# Patient Record
Sex: FEMALE | Race: WHITE | NOT HISPANIC OR LATINO | Employment: FULL TIME | URBAN - METROPOLITAN AREA
[De-identification: names, ages, dates, MRNs, and addresses within clinical notes are randomized per-mention and may not be internally consistent; named-entity substitution may affect disease eponyms.]

---

## 2017-11-15 ENCOUNTER — TRANSCRIBE ORDERS (OUTPATIENT)
Dept: ADMINISTRATIVE | Age: 22
End: 2017-11-15

## 2017-11-15 ENCOUNTER — APPOINTMENT (OUTPATIENT)
Dept: LAB | Age: 22
End: 2017-11-15
Attending: PREVENTIVE MEDICINE

## 2017-11-15 DIAGNOSIS — Z11.1 SCREENING EXAMINATION FOR PULMONARY TUBERCULOSIS: Primary | ICD-10-CM

## 2017-11-15 DIAGNOSIS — Z11.1 SCREENING EXAMINATION FOR PULMONARY TUBERCULOSIS: ICD-10-CM

## 2017-11-15 PROCEDURE — 86480 TB TEST CELL IMMUN MEASURE: CPT

## 2017-11-15 PROCEDURE — 36415 COLL VENOUS BLD VENIPUNCTURE: CPT

## 2017-11-17 LAB
ANNOTATION COMMENT IMP: NORMAL
GAMMA INTERFERON BACKGROUND BLD IA-ACNC: 0.08 IU/ML
M TB IFN-G BLD-IMP: NEGATIVE
M TB IFN-G CD4+ BCKGRND COR BLD-ACNC: 0.06 IU/ML
M TB IFN-G CD4+ T-CELLS BLD-ACNC: 0.14 IU/ML
MITOGEN IGNF BLD-ACNC: 9.42 IU/ML
QUANTIFERON-TB GOLD IN TUBE: NORMAL
SERVICE CMNT-IMP: NORMAL

## 2018-01-24 ENCOUNTER — HOSPITAL ENCOUNTER (EMERGENCY)
Facility: HOSPITAL | Age: 23
Discharge: HOME | End: 2018-01-24
Attending: EMERGENCY MEDICINE
Payer: OTHER MISCELLANEOUS

## 2018-01-24 VITALS
SYSTOLIC BLOOD PRESSURE: 165 MMHG | HEART RATE: 109 BPM | WEIGHT: 160 LBS | OXYGEN SATURATION: 100 % | TEMPERATURE: 97.9 F | RESPIRATION RATE: 18 BRPM | DIASTOLIC BLOOD PRESSURE: 89 MMHG

## 2018-01-24 DIAGNOSIS — Z57.8 EMPLOYEE EXPOSURE TO BODY FLUIDS: Primary | ICD-10-CM

## 2018-01-24 PROCEDURE — 99282 EMERGENCY DEPT VISIT SF MDM: CPT

## 2018-01-24 SDOH — HEALTH STABILITY - PHYSICAL HEALTH: OCCUPATIONAL EXPOSURE TO OTHER RISK FACTORS: Z57.8

## 2018-01-25 NOTE — ED ATTENDING ATTESTATION
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

## 2018-01-25 NOTE — ED PROVIDER NOTES
History  Chief Complaint   Patient presents with    Body Fluid Exposure     Employee had urine splashed on chin while at work  The pt who's urine it was is positive for ESBL     This is a 23 y o  old female who presents to the ED for evaluation of the the body fluid exposure  Patient was cleaning up p r n  with gloves in a gown on when some urine splashed into her chin  It was not an open mouth  She had no open wounds  There is no bladder physical contamination to the urine  She did not have a face mask on at the time  The patient is on contact isolation due to ESBL E coli  She washed immediately with soap and water  She was referred to the ED for evaluation  Otherwise, patient denies fevers, chills, night sweats, cough, congestion, rhinorrhea, CP, dyspnea, abdominal pain, nausea, vomiting, diarrhea, constipation, urinary symptoms, leg pain or swelling  None     History reviewed  No pertinent past medical history  History reviewed  No pertinent surgical history  History reviewed  No pertinent family history  I have reviewed and agree with the history as documented  Social History   Substance Use Topics    Smoking status: Never Smoker    Smokeless tobacco: Never Used    Alcohol use No      Review of Systems   Constitutional: Negative for chills, fatigue, fever and unexpected weight change  HENT: Negative for congestion, rhinorrhea and sore throat  Eyes: Negative for redness and visual disturbance  Respiratory: Negative for cough and shortness of breath  Cardiovascular: Negative for chest pain and leg swelling  Gastrointestinal: Negative for abdominal pain, constipation, diarrhea, nausea and vomiting  Endocrine: Negative for cold intolerance and heat intolerance  Genitourinary: Negative for dysuria, frequency and urgency  Musculoskeletal: Negative for back pain  Skin: Negative for rash  Neurological: Negative for dizziness, syncope and numbness     All other systems reviewed and are negative  Physical Exam  ED Triage Vitals [01/24/18 1910]   Temperature Pulse Respirations Blood Pressure SpO2   97 9 °F (36 6 °C) (!) 109 18 165/89 100 %      Temp Source Heart Rate Source Patient Position - Orthostatic VS BP Location FiO2 (%)   Oral Monitor Sitting Right arm --      Pain Score       No Pain         Physical Exam   Constitutional: She is oriented to person, place, and time  She appears well-developed and well-nourished  No distress  HENT:   Head: Normocephalic and atraumatic  Nose: Nose normal    Mouth/Throat: Oropharynx is clear and moist  No oropharyngeal exudate  Skin on chin intact   Eyes: EOM are normal  Pupils are equal, round, and reactive to light  Neck: Normal range of motion  Pulmonary/Chest: Effort normal  No stridor  No respiratory distress  Musculoskeletal: Normal range of motion  Neurological: She is alert and oriented to person, place, and time  Moving all extremities equally   Skin: Skin is warm and dry  No rash noted  She is not diaphoretic  Psychiatric: She has a normal mood and affect  Nursing note and vitals reviewed  ED Medications  Medications - No data to display    Diagnostic Studies  Results Reviewed     None        No orders to display     Procedures  Procedures    Phone Consults  ED Phone Contact    ED Course    A/P: This is a 23 y o  female who presents to the ED for evaluation of body fluid exposure  No visible blood  This is a nonsignificant exposure  Exposure paperwork completed  Patient return to full duty  Occumed followup PRN  I personally discussed return precautions with this patient  I provided the patient with written discharge instructions and particularly highlighted specific areas of interest to this patient, including but not limited to: medications for symptom managment, follow up recommendations, and return precautions  Patient is in agreement with this plan as outlined above      GISELA Davis Time    Disposition  Final diagnoses:   Employee exposure to body fluids     Time reflects when diagnosis was documented in both MDM as applicable and the Disposition within this note     Time User Action Codes Description Comment    1/24/2018  8:38 PM Ari Montanajean Add [Z57 8] Employee exposure to body fluids       ED Disposition     ED Disposition Condition Comment    Discharge  Adam Chaney discharge to home/self care  Condition at discharge: Stable        Follow-up Information     Follow up With Specialties Details Why Contact Info    Chelsea Broussard PA-C Occupational Medicine Schedule an appointment as soon as possible for a visit As needed [address]  [phone]          There are no discharge medications for this patient  No discharge procedures on file  ED Provider  Attending physically available and evaluated Adam Chaney  I managed the patient along with the ED Attending      Electronically Signed by         Amy Arboleda MD  01/25/18 0773

## 2018-01-25 NOTE — DISCHARGE INSTRUCTIONS
Body Substance Exposure   WHAT YOU NEED TO KNOW:   Body substance exposure is when you come in contact with another person's blood or body fluid that contains blood  Semen or vaginal fluid can also spread infection  Contact may place you at risk for hepatitis B virus (HBV), human immunodeficiency virus (HIV), or hepatitis C virus (HCV)  DISCHARGE INSTRUCTIONS:   Ways that body substance exposure can occur:   · A needle stick or a cut from a sharp object    · Contact with an open wound, such as a cut, chapped skin, or an abrasion     · Contact with the eyes or mucus membrane, such as the lining of the mouth or nose    · Human bite  What to do when exposed to a body substance:   · Clean the area immediately  Wash an open wound with soap and clean water  Flush your eyes with saline solution or water  Rinse mucus membranes with water or saline solution  · Contact your healthcare provider as soon as possible  He will ask how the exposure happened and where the blood or body fluid touched your body  If possible, tell your healthcare provider about the person's health status and history, such as vaccinations  Treatment works best if started as soon as possible  Treatment that may be given for body substance exposure:  Postexposure prophylaxis (PEP) is medical treatment that may protect a person from infection after exposure to another person's body fluids  PEP may be needed if the person whose fluids you were exposed to has a known infection  Do not donate blood, organs, tissues, or semen until your follow-up is completed at 6 months  · PEP for HBV  may include HBV vaccinations or medicine to prevent HVB  This treatment works best if started within 24 hours of exposure  · PEP for HIV  may include 2 or 3 types of medicine to prevent HIV  This treatment works best if started within 72 hours of exposure  Continue treatment for 4 weeks   Practice safe sex to prevent spreading HIV and to prevent pregnancy during the follow-up period  If you are breastfeeding, your healthcare provider may recommend that you stop  Ask your healthcare provider if you can breastfeed  · PEP for HCV  is not  available  You will need to be tested for HCV and treated if you were infected  Follow up with your healthcare provider as directed: You will need more blood tests  PEP for HIV often causes side effects  Talk with your healthcare provider about your symptoms  He will need to make sure you are taking the medicine correctly  Write down your questions so you remember to ask them during your visits  Prevent body substance exposure: If you care for another person who has HBV, HIV, or HCV, protect yourself and others from infection:  · Wash your hands thoroughly  before and after you provide medical care  · Use protective equipment  Gloves or a face mask may protect your hands, nose, and mouth from splashes of blood or body fluid  · Do not recap needles after use  Recapping needles increases your risk of a needle stick  · Throw away needles in a safe container  A hard container with a lid may prevent accidental needle sticks  Contact your healthcare provider if:   · You have a fever  · You have a rash  · You have weakness or muscle pain  · You have abdominal pain, nausea, or vomiting  · You have diarrhea  · You have a headache  · You have questions or concerns about your condition or care  © 2017 [employer]  Information is for End User's use only and may not be sold, redistributed or otherwise used for commercial purposes  All illustrations and images included in CareNotes® are the copyrighted property of [employer]  or [employer]  The above information is an  only  It is not intended as medical advice for individual conditions or treatments   Talk to your doctor, nurse or pharmacist before following any medical regimen to see if it is safe and effective for you

## 2018-11-25 ENCOUNTER — HOSPITAL ENCOUNTER (EMERGENCY)
Facility: HOSPITAL | Age: 23
Discharge: HOME | End: 2018-11-26
Attending: EMERGENCY MEDICINE | Admitting: EMERGENCY MEDICINE
Payer: COMMERCIAL

## 2018-11-25 VITALS
SYSTOLIC BLOOD PRESSURE: 161 MMHG | TEMPERATURE: 98.4 F | OXYGEN SATURATION: 99 % | HEART RATE: 101 BPM | RESPIRATION RATE: 18 BRPM | DIASTOLIC BLOOD PRESSURE: 85 MMHG

## 2018-11-25 DIAGNOSIS — R25.2 MUSCLE CRAMPS: Primary | ICD-10-CM

## 2018-11-25 PROCEDURE — 99283 EMERGENCY DEPT VISIT LOW MDM: CPT

## 2018-11-26 LAB
ANION GAP SERPL CALCULATED.3IONS-SCNC: 10 MMOL/L (ref 4–13)
BUN SERPL-MCNC: 17 MG/DL (ref 5–25)
CALCIUM SERPL-MCNC: 9.4 MG/DL (ref 8.3–10.1)
CHLORIDE SERPL-SCNC: 101 MMOL/L (ref 100–108)
CO2 SERPL-SCNC: 26 MMOL/L (ref 21–32)
CREAT SERPL-MCNC: 0.81 MG/DL (ref 0.6–1.3)
GFR SERPL CREATININE-BSD FRML MDRD: 104 ML/MIN/1.73SQ M
GLUCOSE SERPL-MCNC: 105 MG/DL (ref 65–140)
MAGNESIUM SERPL-MCNC: 2 MG/DL (ref 1.6–2.6)
PHOSPHATE SERPL-MCNC: 4 MG/DL (ref 2.7–4.5)
POTASSIUM SERPL-SCNC: 3.9 MMOL/L (ref 3.5–5.3)
SODIUM SERPL-SCNC: 137 MMOL/L (ref 136–145)

## 2018-11-26 PROCEDURE — 83735 ASSAY OF MAGNESIUM: CPT | Performed by: EMERGENCY MEDICINE

## 2018-11-26 PROCEDURE — 80048 BASIC METABOLIC PNL TOTAL CA: CPT | Performed by: EMERGENCY MEDICINE

## 2018-11-26 PROCEDURE — 84100 ASSAY OF PHOSPHORUS: CPT | Performed by: EMERGENCY MEDICINE

## 2018-11-26 PROCEDURE — 96361 HYDRATE IV INFUSION ADD-ON: CPT

## 2018-11-26 PROCEDURE — 96374 THER/PROPH/DIAG INJ IV PUSH: CPT

## 2018-11-26 PROCEDURE — 36415 COLL VENOUS BLD VENIPUNCTURE: CPT | Performed by: EMERGENCY MEDICINE

## 2018-11-26 RX ORDER — KETOROLAC TROMETHAMINE 30 MG/ML
15 INJECTION, SOLUTION INTRAMUSCULAR; INTRAVENOUS ONCE
Status: COMPLETED | OUTPATIENT
Start: 2018-11-26 | End: 2018-11-26

## 2018-11-26 RX ORDER — CYCLOBENZAPRINE HCL 10 MG
10 TABLET ORAL 2 TIMES DAILY PRN
Qty: 20 TABLET | Refills: 0 | Status: SHIPPED | OUTPATIENT
Start: 2018-11-26 | End: 2020-01-09

## 2018-11-26 RX ADMIN — KETOROLAC TROMETHAMINE 15 MG: 30 INJECTION, SOLUTION INTRAMUSCULAR at 00:52

## 2018-11-26 RX ADMIN — SODIUM CHLORIDE 1000 ML: 0.9 INJECTION, SOLUTION INTRAVENOUS at 00:50

## 2018-11-26 NOTE — ED PROVIDER NOTES
History  Chief Complaint   Patient presents with    Leg Pain     pt c/o "getting a charlier horse earlier today in her right calf " Pt states the pain comes and goes, no redness or warmth      28-year-old female presents chief complaint of right calf pain  Patient reports she has been getting charley horses (muscle cramps of her calf muscle) for the past month  Patient reports this morning she woke up with a severe cramp in her calf and it took quite some time for her to stretch the muscle out  Over the course of the day she has had pain with ambulation and point tenderness in her calf  No swelling or erythema  No chest pain or shortness of breath  No clinical signs of DVT  History provided by:  Patient   used: No    Leg Pain   Location:  Leg  Leg location:  R lower leg  Pain details:     Quality:  Aching and cramping    Radiates to:  Does not radiate    Severity:  Mild    Onset quality:  Gradual    Duration:  1 month    Timing:  Intermittent    Progression:  Waxing and waning  Chronicity:  New  Dislocation: no    Prior injury to area:  No  Relieved by: stretching  Worsened by: Activity  Ineffective treatments:  None tried  Associated symptoms: stiffness    Associated symptoms: no fever, no numbness and no swelling        None       History reviewed  No pertinent past medical history  History reviewed  No pertinent surgical history  History reviewed  No pertinent family history  I have reviewed and agree with the history as documented  Social History   Substance Use Topics    Smoking status: Never Smoker    Smokeless tobacco: Never Used    Alcohol use No        Review of Systems   Constitutional: Negative for fever  Respiratory: Negative for chest tightness and shortness of breath  Cardiovascular: Negative for leg swelling  Musculoskeletal: Positive for myalgias (right lower leg pain) and stiffness  Skin: Negative for color change and wound     Neurological: Negative for weakness and numbness  Physical Exam  Physical Exam   Constitutional: She is oriented to person, place, and time  She appears well-developed and well-nourished  No distress  HENT:   Head: Normocephalic and atraumatic  Eyes: Pupils are equal, round, and reactive to light  EOM are normal    Neck: Normal range of motion  No JVD present  Cardiovascular: Normal rate, regular rhythm, normal heart sounds and intact distal pulses  Exam reveals no gallop and no friction rub  No murmur heard  Pulmonary/Chest: Effort normal and breath sounds normal  No respiratory distress  She has no wheezes  She has no rales  She exhibits no tenderness  Musculoskeletal: Normal range of motion  She exhibits no tenderness  Neurological: She is alert and oriented to person, place, and time  Skin: Skin is warm and dry  Psychiatric: She has a normal mood and affect  Her behavior is normal  Judgment and thought content normal    Nursing note and vitals reviewed        Vital Signs  ED Triage Vitals [11/25/18 2257]   Temperature Pulse Respirations Blood Pressure SpO2   98 4 °F (36 9 °C) 101 18 161/85 99 %      Temp Source Heart Rate Source Patient Position - Orthostatic VS BP Location FiO2 (%)   Oral Monitor Sitting Left arm --      Pain Score       6           Vitals:    11/25/18 2257   BP: 161/85   Pulse: 101   Patient Position - Orthostatic VS: Sitting       Visual Acuity      ED Medications  Medications   sodium chloride 0 9 % bolus 1,000 mL (0 mL Intravenous Stopped 11/26/18 0127)   ketorolac (TORADOL) injection 15 mg (15 mg Intravenous Given 11/26/18 0052)       Diagnostic Studies  Results Reviewed     Procedure Component Value Units Date/Time    Basic metabolic panel [[id number]] Collected:  11/26/18 0049    Lab Status:  Final result Specimen:  Blood Updated:  11/26/18 0105     Sodium 137 mmol/L      Potassium 3 9 mmol/L      Chloride 101 mmol/L      CO2 26 mmol/L      ANION GAP 10 mmol/L      BUN 17 mg/dL Creatinine 0 81 mg/dL      Glucose 105 mg/dL      Calcium 9 4 mg/dL      eGFR 104 ml/min/1 73sq m     Narrative:         National Kidney Disease Education Program recommendations are as follows:  GFR calculation is accurate only with a steady state creatinine  Chronic Kidney disease less than 60 ml/min/1 73 sq  meters  Kidney failure less than 15 ml/min/1 73 sq  meters  Phosphorus [[id number]]  (Normal) Collected:  11/26/18 0049    Lab Status:  Final result Specimen:  Blood Updated:  11/26/18 0105     Phosphorus 4 0 mg/dL     Magnesium [[id number]]  (Normal) Collected:  11/26/18 0049    Lab Status:  Final result Specimen:  Blood Updated:  11/26/18 0105     Magnesium 2 0 mg/dL                  No orders to display              Procedures  Procedures       Phone Contacts  ED Phone Contact    ED Course                               MDM  Number of Diagnoses or Management Options  Muscle cramps: new and requires workup  Diagnosis management comments: Right calf pain and cramping  Possible metabolic derangement  No signs of DVT or Baker cyst this point  Will check metabolites  Will give patient return instructions for DVT signs and symptoms  Amount and/or Complexity of Data Reviewed  Clinical lab tests: ordered and reviewed    Risk of Complications, Morbidity, and/or Mortality  Diagnostic procedures: high    Patient Progress  Patient progress: stable    CritCare Time    Disposition  Final diagnoses:   Muscle cramps     Time reflects when diagnosis was documented in both MDM as applicable and the Disposition within this note     Time User Action Codes Description Comment    11/26/2018  1:14 AM Olena Hatfield Add [R25 2] Muscle cramps       ED Disposition     ED Disposition Condition Comment    Discharge  May Dontrell discharge to home/self care      Condition at discharge: Good        Follow-up Information     Follow up With Specialties Details Why Contact Info    Jimi Darnell, [id number] Practitioner Schedule an appointment as soon as possible for a visit As needed [address] [place] [address]  [phone]            Discharge Medication List as of 11/26/2018  1:14 AM      START taking these medications    Details   cyclobenzaprine (FLEXERIL) 10 mg tablet Take 1 tablet (10 mg total) by mouth 2 (two) times a day as needed for muscle spasms for up to 10 days, Starting Mon 11/26/2018, Until Thu 12/6/2018, Print           No discharge procedures on file      ED Provider  Electronically Signed by           Qasim Ulloa MD  11/26/18 7169

## 2018-11-26 NOTE — DISCHARGE INSTRUCTIONS
Muscle Cramp   WHAT YOU NEED TO KNOW:   A muscle cramp is a sudden, sharp pain or spasm in a muscle  It lasts from a few seconds to a few minutes  Muscle cramps most often occur in your legs or feet  They are also common along your ribcage and in your arms and hands  DISCHARGE INSTRUCTIONS:   Manage a muscle cramp:  Muscle cramps often go away without any treatment  You can do the following to help relieve a cramp:  · Stop the activity  that caused the muscle cramp  · Stretch or massage  your muscle until the cramp goes away  · Apply ice  to sore muscles  Use an ice pack, or put crushed ice in a plastic bag  Cover it with a towel, and place it on your sore muscles for 15 to 20 minutes every hour or as directed  Ice decreases swelling and pain  · Apply heat  to tense, tight muscles for 20 to 30 minutes every 2 hours for as many days as directed  Heat helps decrease pain and muscle spasms  Prevent a muscle cramp:   · Stretch your muscles  Stretch 3 times daily, including before bedtime and before exercise  Stretch briefly, and then release each stretch  Do not stretch so far that you feel pain  Daily stretches will relax your muscles and increase flexibility  Ask your healthcare provider for instructions on muscle stretches that are right for you  · Warm up before you exercise  Run in place slowly or walk at a brisk pace to warm your muscles  · Drink liquids as directed  Liquids can help prevent muscle cramps caused by dehydration  Ask your healthcare provider how much liquid to drink each day and which liquids are best for you  You may need to drink liquids that replace lost electrolytes, such as sports drinks  · Eat a variety of healthy foods  Healthy foods may help prevent muscle cramps  Healthy foods include bananas, beans, avocados, or other foods high in electrolytes  Ask if you should eat more carbohydrates    Follow up with your healthcare provider as directed:  Write down your questions so you remember to ask them during your visits  Contact your healthcare provider if:   · Your cramp does not go away, even after you stretch and apply ice or heat  · You have muscle cramps often  · You have questions or concerns about your condition or care  Return to the emergency department if:   · You cannot urinate, or your urine is dark yellow or brown within 24 hours of the cramp  · You have pain in your neck or back  · Your arm or leg is weak or numb, or the area around your cramp is numb  · You have trouble moving your cramped muscle  © 2017 [employer]  Information is for End User's use only and may not be sold, redistributed or otherwise used for commercial purposes  All illustrations and images included in CareNotes® are the copyrighted property of [employer]  or [employer]  The above information is an  only  It is not intended as medical advice for individual conditions or treatments  Talk to your doctor, nurse or pharmacist before following any medical regimen to see if it is safe and effective for you

## 2019-12-31 ENCOUNTER — OFFICE VISIT (OUTPATIENT)
Dept: URGENT CARE | Facility: CLINIC | Age: 24
End: 2019-12-31
Payer: COMMERCIAL

## 2019-12-31 VITALS
HEART RATE: 100 BPM | SYSTOLIC BLOOD PRESSURE: 128 MMHG | DIASTOLIC BLOOD PRESSURE: 71 MMHG | WEIGHT: 187 LBS | HEIGHT: 63 IN | TEMPERATURE: 98 F | BODY MASS INDEX: 33.13 KG/M2 | RESPIRATION RATE: 20 BRPM | OXYGEN SATURATION: 97 %

## 2019-12-31 DIAGNOSIS — J11.1 INFLUENZA: Primary | ICD-10-CM

## 2019-12-31 PROCEDURE — 99213 OFFICE O/P EST LOW 20 MIN: CPT | Performed by: PHYSICIAN ASSISTANT

## 2019-12-31 RX ORDER — OSELTAMIVIR PHOSPHATE 75 MG/1
75 CAPSULE ORAL EVERY 12 HOURS SCHEDULED
Qty: 10 CAPSULE | Refills: 0 | Status: SHIPPED | OUTPATIENT
Start: 2019-12-31 | End: 2020-01-05

## 2019-12-31 RX ORDER — BROMPHENIRAMINE MALEATE, PSEUDOEPHEDRINE HYDROCHLORIDE, AND DEXTROMETHORPHAN HYDROBROMIDE 2; 30; 10 MG/5ML; MG/5ML; MG/5ML
10 SYRUP ORAL 4 TIMES DAILY PRN
Qty: 118 ML | Refills: 0 | Status: SHIPPED | OUTPATIENT
Start: 2019-12-31 | End: 2021-01-05

## 2019-12-31 NOTE — PROGRESS NOTES
[address] [facility] [address] [employer] [facility]        NAME: Clemente Lujan is a 27 y o  female  : 1995    MRN: [id number]  DATE: 2019  TIME: 9:11 AM    Assessment and Plan   Influenza [J11 1]  1  Influenza  oseltamivir (TAMIFLU) 75 mg capsule    brompheniramine-pseudoephedrine-DM 30-2-10 MG/5ML syrup         Patient Instructions   Prescriptions sent to the pharmacy for Tamiflu and Bromfed-use as directed  Saline nasal spray several times daily, cool mist humidifier, Tylenol/ibuprofen as needed for fever or pain  Follow up with PCP in 3-5 days  Proceed to  ER if symptoms worsen  Chief Complaint     Chief Complaint   Patient presents with    Flu Symptoms     started yesterday with fever chills cough, nasal congestion  Temp 101this am taking tylenol  History of Present Illness    The patient is a 51-year-old female who presented with flu symptoms that started yesterday  Positive fever 101-102 F  Shaking chills  Severe myalgias  Positive headache  Positive nasal and sinus congestion  Nonproductive cough without shortness of breath or wheezing  Negative vomiting or diarrhea  HPI    Review of Systems   Review of Systems   Constitutional: Positive for activity change, appetite change, chills, fatigue and fever  HENT: Positive for congestion, postnasal drip and rhinorrhea  Negative for ear discharge, ear pain, facial swelling, mouth sores, sinus pressure, sinus pain, sneezing, sore throat and trouble swallowing  Eyes: Negative for pain, discharge, redness and itching  Respiratory: Positive for cough  Negative for apnea, chest tightness, shortness of breath, wheezing and stridor  Cardiovascular: Negative for chest pain  Gastrointestinal: Negative for abdominal distention, abdominal pain, diarrhea, nausea and vomiting  Genitourinary: Negative for difficulty urinating  Musculoskeletal: Positive for myalgias  Negative for arthralgias  Skin: Negative for pallor and rash  Allergic/Immunologic: Negative  Neurological: Positive for headaches  Negative for dizziness and light-headedness  Hematological: Negative  Psychiatric/Behavioral: Negative  All other systems reviewed and are negative  Current Medications       Current Outpatient Medications:     desogestrel-ethinyl estradiol (CAZIANT) 0 1/0 125/0 15 -0 025 MG tablet, TAKE 1 TABLET BY MOUTH EVERY DAY, Disp: , Rfl:     brompheniramine-pseudoephedrine-DM 30-2-10 MG/5ML syrup, Take 10 mL by mouth 4 (four) times a day as needed for congestion, Disp: 118 mL, Rfl: 0    cyclobenzaprine (FLEXERIL) 10 mg tablet, Take 1 tablet (10 mg total) by mouth 2 (two) times a day as needed for muscle spasms for up to 10 days, Disp: 20 tablet, Rfl: 0    oseltamivir (TAMIFLU) 75 mg capsule, Take 1 capsule (75 mg total) by mouth every 12 (twelve) hours for 5 days, Disp: 10 capsule, Rfl: 0    Current Allergies     Allergies as of 12/31/2019 - Reviewed 12/31/2019   Allergen Reaction Noted    Penicillins Rash 01/24/2018            The following portions of the patient's history were reviewed and updated as appropriate: allergies, current medications, past family history, past medical history, past social history, past surgical history and problem list      No past medical history on file  No past surgical history on file  No family history on file  Medications have been verified  Objective   /71 (Patient Position: Sitting)   Pulse 100   Temp 98 °F (36 7 °C) (Temporal)   Resp 20   Ht 5' 3" (1 6 m)   Wt 84 8 kg (187 lb)   SpO2 97%   BMI 33 13 kg/m²        Physical Exam     Physical Exam   Constitutional: She is oriented to person, place, and time  She appears well-developed and well-nourished  Non-toxic appearance  She does not have a sickly appearance  She appears ill  No distress  HENT:   Head: Normocephalic and atraumatic     Right Ear: Hearing, tympanic membrane, external ear and ear canal normal  No drainage or tenderness  Tympanic membrane is not perforated, not erythematous, not retracted and not bulging  No middle ear effusion  No decreased hearing is noted  Left Ear: Hearing, tympanic membrane, external ear and ear canal normal  No drainage or tenderness  Tympanic membrane is not perforated, not erythematous, not retracted and not bulging  No middle ear effusion  No decreased hearing is noted  Nose: Nose normal  No mucosal edema, rhinorrhea or septal deviation  Right sinus exhibits no maxillary sinus tenderness and no frontal sinus tenderness  Left sinus exhibits no maxillary sinus tenderness and no frontal sinus tenderness  Mouth/Throat: Uvula is midline, oropharynx is clear and moist and mucous membranes are normal  No oral lesions  No dental abscesses or uvula swelling  No oropharyngeal exudate, posterior oropharyngeal edema, posterior oropharyngeal erythema or tonsillar abscesses  Eyes: Pupils are equal, round, and reactive to light  Conjunctivae and EOM are normal    Neck: Trachea normal, normal range of motion and full passive range of motion without pain  Neck supple  No JVD present  No edema and no erythema present  No thyromegaly present  Cardiovascular: Normal rate, regular rhythm, S1 normal, S2 normal, normal heart sounds, intact distal pulses and normal pulses  No murmur heard  Pulmonary/Chest: Effort normal and breath sounds normal  No accessory muscle usage or stridor  No tachypnea  No respiratory distress  She has no decreased breath sounds  She has no wheezes  She has no rhonchi  She has no rales  Abdominal: Soft  Normal appearance and bowel sounds are normal  She exhibits no distension  There is no hepatosplenomegaly  There is no tenderness  Musculoskeletal: Normal range of motion  She exhibits no edema  Neurological: She is alert and oriented to person, place, and time  Skin: Skin is warm, dry and intact  No abrasion, no lesion and no rash noted   She is not diaphoretic  No cyanosis or erythema  Nails show no clubbing  Psychiatric: She has a normal mood and affect  Her speech is normal and behavior is normal    Nursing note and vitals reviewed

## 2019-12-31 NOTE — LETTER
December 31, 2019     Patient: Ioana Fink   YOB: 1995   Date of Visit: 12/31/2019       To Whom it May Concern:    Ioana Fink is under my professional care  She was seen in my office on 12/31/2019  She may return to work when she is fever and symptom free  If you have any questions or concerns, please don't hesitate to call           Sincerely,          Arsalan Plaza PA-C        CC: Ioana Fink

## 2019-12-31 NOTE — PATIENT INSTRUCTIONS
Prescriptions sent to the pharmacy for Tamiflu and Bromfed-use as directed  Saline nasal spray several times daily, cool mist humidifier, Tylenol/ibuprofen as needed for fever or pain  Follow up with PCP in 3-5 days  Proceed to  ER if symptoms worsen  Influenza   AMBULATORY CARE:   Influenza  (the flu) is an infection caused by the influenza virus  The flu is easily spread when an infected person coughs, sneezes, or has close contact with others  You may be able to spread the flu to others for 1 week or longer after signs or symptoms appear  Common signs and symptoms include the following:   · Fever and chills    · Headaches, body aches, and muscle or joint pain    · Cough, runny nose, and sore throat    · Loss of appetite, nausea, vomiting, or diarrhea    · Tiredness    · Trouble breathing  Call 911 for any of the following:   · You have trouble breathing, and your lips look purple or blue  · You have a seizure  Seek care immediately if:   · You are dizzy, or you are urinating less or not at all  · You have a headache with a stiff neck, and you feel tired or confused  · You have new pain or pressure in your chest     · Your symptoms, such as shortness of breath, vomiting, or diarrhea, get worse  · Your symptoms, such as fever and coughing, seem to get better, but then get worse  Contact your healthcare provider if:   · You have new muscle pain or weakness  · You have questions or concerns about your condition or care  Treatment for influenza  may include any of the following:  · Acetaminophen  decreases pain and fever  It is available without a doctor's order  Ask how much to take and how often to take it  Follow directions  Acetaminophen can cause liver damage if not taken correctly  · NSAIDs , such as ibuprofen, help decrease swelling, pain, and fever  This medicine is available with or without a doctor's order   NSAIDs can cause stomach bleeding or kidney problems in certain people  If you take blood thinner medicine, always ask your healthcare provider if NSAIDs are safe for you  Always read the medicine label and follow directions  · Antivirals  help fight a viral infection  Manage your symptoms:   · Rest  as much as you can to help you recover  · Drink liquids as directed  to help prevent dehydration  Ask how much liquid to drink each day and which liquids are best for you  Prevent the spread of the flu:   · Wash your hands often  Use soap and water  Wash your hands after you use the bathroom, change a child's diapers, or sneeze  Wash your hands before you prepare or eat food  Use gel hand cleanser when soap and water are not available  Do not touch your eyes, nose, or mouth unless you have washed your hands first            · Cover your mouth when you sneeze or cough  Cough into a tissue or the bend of your arm  · Clean shared items with a germ-killing   Clean table surfaces, doorknobs, and light switches  Do not share towels, silverware, and dishes with people who are sick  Wash bed sheets, towels, silverware, and dishes with soap and water  · Wear a mask  over your mouth and nose if you are sick or are near anyone who is sick  · Stay away from others  if you are sick  · Influenza vaccine  helps prevent influenza (flu)  Everyone older than 6 months should get a yearly influenza vaccine  Get the vaccine as soon as it is available, usually in September or October each year  Follow up with your healthcare provider as directed:  Write down your questions so you remember to ask them during your visits  © 2017 [address] [employer] Information is for End User's use only and may not be sold, redistributed or otherwise used for commercial purposes  All illustrations and images included in CareNotes® are the copyrighted property of [employer]  or [employer] Prado  The above information is an  only   It is not intended as medical advice for individual conditions or treatments  Talk to your doctor, nurse or pharmacist before following any medical regimen to see if it is safe and effective for you

## 2020-01-09 ENCOUNTER — OFFICE VISIT (OUTPATIENT)
Dept: URGENT CARE | Facility: CLINIC | Age: 25
End: 2020-01-09
Payer: COMMERCIAL

## 2020-01-09 VITALS
SYSTOLIC BLOOD PRESSURE: 142 MMHG | WEIGHT: 184 LBS | TEMPERATURE: 97.9 F | BODY MASS INDEX: 32.6 KG/M2 | HEIGHT: 63 IN | HEART RATE: 96 BPM | RESPIRATION RATE: 18 BRPM | DIASTOLIC BLOOD PRESSURE: 96 MMHG | OXYGEN SATURATION: 97 %

## 2020-01-09 DIAGNOSIS — J01.00 ACUTE MAXILLARY SINUSITIS, RECURRENCE NOT SPECIFIED: Primary | ICD-10-CM

## 2020-01-09 PROCEDURE — 99213 OFFICE O/P EST LOW 20 MIN: CPT | Performed by: PHYSICIAN ASSISTANT

## 2020-01-09 RX ORDER — CLINDAMYCIN HYDROCHLORIDE 300 MG/1
300 CAPSULE ORAL 3 TIMES DAILY
Qty: 21 CAPSULE | Refills: 0 | Status: SHIPPED | OUTPATIENT
Start: 2020-01-09 | End: 2020-01-16

## 2020-01-09 RX ORDER — BENZONATATE 200 MG/1
200 CAPSULE ORAL 3 TIMES DAILY PRN
Qty: 20 CAPSULE | Refills: 0 | Status: SHIPPED | OUTPATIENT
Start: 2020-01-09 | End: 2021-01-05

## 2020-01-09 NOTE — LETTER
January 9, 2020     Patient: Abdi Judge   YOB: 1995   Date of Visit: 1/9/2020       To Whom it May Concern:    Abdi Judge is under my professional care  She was seen in my office on 1/9/2020  She may return to work 1/11/2020  If you have any questions or concerns, please don't hesitate to call           Sincerely,          Magdy Coley PA-C        CC: Abdi Sanchezs

## 2020-01-10 NOTE — PATIENT INSTRUCTIONS
Prescriptions sent to the pharmacy for clindamycin and Tessalon Perles-use as directed  Saline nasal spray several times daily, Flonase in a m , cool mist humidifier, Tylenol/ibuprofen as needed for fever or pain  Follow up with PCP in 3-5 days  Proceed to  ER if symptoms worsen  Rhinosinusitis   AMBULATORY CARE:   Rhinosinusitis (RS)  is inflammation of your nose and sinuses  It commonly begins as a virus, often as a common cold  Viruses usually last 7 to 10 days and do not need treatment  When the virus does not get better on its own, you may have bacterial RS  This means that bacteria have begun to grow inside your sinuses  Acute RS lasts less than 4 weeks  Chronic RS lasts 12 weeks or more  Recurrent RS is when you have 4 or more episodes of RS in one year  Your signs and symptoms  may be worse when you lie on your back or try to sleep  You may have any of the following:  · Stuffy nose and reduced sense of smell     · Runny nose with thick yellow or green mucus     · Pressure or pain on your face or a headache     · Pain in your teeth or bad breath     · Ear pain or pressure     · Fever or cough     · Tiredness  Seek care immediately if:   · You have double vision or you cannot see  · You have a stiff neck, a fever, or a bad headache  · Your eyeball bulges out or you cannot move your eye  · Your eye and eyelid are red, swollen, and painful  · You cannot open your eye  · You are more sleepy than normal, or you notice changes in your ability to think, move, or talk  · You have swelling of your forehead or scalp  Contact your healthcare provider if:   · Your symptoms are worse or do not improve after 3 to 5 days of treatment  · You have questions or concerns about your condition or care  Treatment for rhinosinusitis  may include any of the following:  · Acetaminophen  decreases pain and fever  It is available without a doctor's order   Ask how much to take and how often to take it  Follow directions  Acetaminophen can cause liver damage if not taken correctly  · NSAIDs , such as ibuprofen, help decrease swelling, pain, and fever  This medicine is available with or without a doctor's order  NSAIDs can cause stomach bleeding or kidney problems in certain people  If you take blood thinner medicine, always ask your healthcare provider if NSAIDs are safe for you  Always read the medicine label and follow directions  · Nasal steroid sprays  decrease inflammation in your nose and sinuses  · Decongestants  reduce swelling and drain mucus in the nose and sinuses  They may help you breathe easier  · Antihistamines  dry mucus in the nose and relieve sneezing  · Antibiotics  treat a bacterial infection and may be needed if your symptoms do not improve or they get worse  · Take your medicine as directed  Contact your healthcare provider if you think your medicine is not helping or if you have side effects  Tell him or her if you are allergic to any medicine  Keep a list of the medicines, vitamins, and herbs you take  Include the amounts, and when and why you take them  Bring the list or the pill bottles to follow-up visits  Carry your medicine list with you in case of an emergency  Self-care:   · Rinse your sinuses  Use a sinus rinse device to rinse your nasal passages with a saline (salt water) solution  This will help thin the mucus in your nose and rinse away pollen and dirt  It will also help reduce swelling so you can breathe normally  Ask your healthcare provider how often to do this  · Breathe in steam   Heat a bowl of water until you see steam  Lean over the bowl and make a tent over your head with a large towel  Breathe deeply for about 20 minutes  Be careful not to get too close to the steam or burn yourself  Do this 3 times a day  You can also breathe deeply when you take a hot shower  · Sleep with your head elevated    Place an extra pillow under your head before you go to sleep to help your sinuses drain  · Drink liquids as directed  Ask your healthcare provider how much liquid to drink each day and which liquids are best for you  Liquids will thin the mucus in your nose and help it drain  Avoid drinks that contain alcohol or caffeine  · Do not smoke, and avoid secondhand smoke  Nicotine and other chemicals in cigarettes and cigars can make your symptoms worse  Ask your healthcare provider for information if you currently smoke and need help to quit  E-cigarettes or smokeless tobacco still contain nicotine  Talk to your healthcare provider before you use these products  Follow up with your healthcare provider as directed: Follow up if your symptoms are worse or not better after 3 to 5 days of treatment  Write down your questions so you remember to ask them during your visits  © 2017 [facility] Information is for End User's use only and may not be sold, redistributed or otherwise used for commercial purposes  All illustrations and images included in CareNotes® are the copyrighted property of [employer]  or [employer] Prado  The above information is an  only  It is not intended as medical advice for individual conditions or treatments  Talk to your doctor, nurse or pharmacist before following any medical regimen to see if it is safe and effective for you

## 2020-01-10 NOTE — PROGRESS NOTES
[facility]        NAME: Steve Prince is a 27 y o  female  : 1995    MRN: [id number]  DATE: 2020  TIME: 8:24 PM    Assessment and Plan   Acute maxillary sinusitis, recurrence not specified [J01 00]  1  Acute maxillary sinusitis, recurrence not specified  clindamycin (CLEOCIN) 300 MG capsule    benzonatate (TESSALON) 200 MG capsule         Patient Instructions   Prescriptions sent to the pharmacy for clindamycin and Tessalon Perles-use as directed  Saline nasal spray several times daily, Flonase in a m , cool mist humidifier, Tylenol/ibuprofen as needed for fever or pain  Follow up with PCP in 3-5 days  Proceed to  ER if symptoms worsen  Chief Complaint     Chief Complaint   Patient presents with    Cough     x 10 days    Fatigue         History of Present Illness   The patient is a 24-year-old female who presents with cold symptoms that have been present for a week and a half  She was diagnosed with influenza last week and finished a course of Tamiflu  She states that her fever, headache and body aches have improved  She continues to have fatigue  Positive nasal and sinus congestion  Positive facial pain and pressure  Nonproductive cough without shortness of breath or wheezing  Negative abdominal pain, nausea, vomiting or diarrhea  She is currently taking Mucinex for her symptoms  HPI    Review of Systems   Review of Systems   Constitutional: Positive for fatigue  Negative for activity change, chills and fever  HENT: Positive for congestion, postnasal drip, rhinorrhea, sinus pressure and sinus pain  Negative for ear discharge, ear pain, facial swelling, mouth sores, sneezing, sore throat and trouble swallowing  Eyes: Negative for pain, discharge, redness and itching  Respiratory: Positive for cough  Negative for apnea, chest tightness, shortness of breath, wheezing and stridor  Cardiovascular: Negative for chest pain     Gastrointestinal: Negative for abdominal distention, abdominal pain, diarrhea, nausea and vomiting  Genitourinary: Negative for difficulty urinating  Musculoskeletal: Negative for arthralgias and myalgias  Skin: Negative for pallor and rash  Allergic/Immunologic: Negative  Neurological: Negative for dizziness, light-headedness and headaches  Hematological: Negative  Psychiatric/Behavioral: Negative  All other systems reviewed and are negative  Current Medications       Current Outpatient Medications:     desogestrel-ethinyl estradiol (CAZIANT) 0 1/0 125/0 15 -0 025 MG tablet, TAKE 1 TABLET BY MOUTH EVERY DAY, Disp: , Rfl:     benzonatate (TESSALON) 200 MG capsule, Take 1 capsule (200 mg total) by mouth 3 (three) times a day as needed for cough, Disp: 20 capsule, Rfl: 0    brompheniramine-pseudoephedrine-DM 30-2-10 MG/5ML syrup, Take 10 mL by mouth 4 (four) times a day as needed for congestion, Disp: 118 mL, Rfl: 0    clindamycin (CLEOCIN) 300 MG capsule, Take 1 capsule (300 mg total) by mouth 3 (three) times a day for 7 days, Disp: 21 capsule, Rfl: 0    Current Allergies     Allergies as of 01/09/2020 - Reviewed 01/09/2020   Allergen Reaction Noted    Penicillins Rash 01/24/2018            The following portions of the patient's history were reviewed and updated as appropriate: allergies, current medications, past family history, past medical history, past social history, past surgical history and problem list      History reviewed  No pertinent past medical history  History reviewed  No pertinent surgical history  No family history on file  Medications have been verified  Objective   /96   Pulse 96   Temp 97 9 °F (36 6 °C)   Resp 18   Ht 5' 3" (1 6 m)   Wt 83 5 kg (184 lb)   SpO2 97%   BMI 32 59 kg/m²        Physical Exam     Physical Exam   Constitutional: She is oriented to person, place, and time  She appears well-developed and well-nourished  She appears ill  No distress     HENT: Head: Normocephalic and atraumatic  Right Ear: Hearing, tympanic membrane, external ear and ear canal normal  No drainage or tenderness  Tympanic membrane is not perforated, not erythematous, not retracted and not bulging  No middle ear effusion  No decreased hearing is noted  Left Ear: Hearing, tympanic membrane, external ear and ear canal normal  No drainage or tenderness  Tympanic membrane is not perforated, not erythematous, not retracted and not bulging  No middle ear effusion  No decreased hearing is noted  Nose: Mucosal edema and rhinorrhea present  No septal deviation  Right sinus exhibits maxillary sinus tenderness and frontal sinus tenderness  Left sinus exhibits maxillary sinus tenderness and frontal sinus tenderness  Mouth/Throat: Uvula is midline and mucous membranes are normal  No oral lesions  No dental abscesses or uvula swelling  Posterior oropharyngeal erythema present  No oropharyngeal exudate, posterior oropharyngeal edema or tonsillar abscesses  Eyes: Pupils are equal, round, and reactive to light  Conjunctivae and EOM are normal    Neck: Trachea normal, normal range of motion and full passive range of motion without pain  Neck supple  No JVD present  No edema and no erythema present  No thyromegaly present  Cardiovascular: Normal rate, regular rhythm, S1 normal, S2 normal, normal heart sounds, intact distal pulses and normal pulses  No murmur heard  Pulmonary/Chest: Effort normal and breath sounds normal  No accessory muscle usage or stridor  No tachypnea  No respiratory distress  She has no decreased breath sounds  She has no wheezes  She has no rhonchi  She has no rales  Abdominal: Soft  Normal appearance and bowel sounds are normal  She exhibits no distension  There is no hepatosplenomegaly  There is no tenderness  Musculoskeletal: Normal range of motion  She exhibits no edema  Neurological: She is alert and oriented to person, place, and time     Skin: Skin is warm, dry and intact  No abrasion, no lesion and no rash noted  She is not diaphoretic  No cyanosis or erythema  Nails show no clubbing  Psychiatric: She has a normal mood and affect  Her speech is normal and behavior is normal    Nursing note and vitals reviewed

## 2020-09-29 ENCOUNTER — APPOINTMENT (OUTPATIENT)
Dept: URGENT CARE | Age: 25
End: 2020-09-29
Payer: OTHER MISCELLANEOUS

## 2020-09-29 ENCOUNTER — APPOINTMENT (OUTPATIENT)
Dept: RADIOLOGY | Age: 25
End: 2020-09-29
Attending: FAMILY MEDICINE
Payer: OTHER MISCELLANEOUS

## 2020-09-29 DIAGNOSIS — M54.50 ACUTE LOW BACK PAIN, UNSPECIFIED BACK PAIN LATERALITY, UNSPECIFIED WHETHER SCIATICA PRESENT: Primary | ICD-10-CM

## 2020-09-29 DIAGNOSIS — M54.50 ACUTE LOW BACK PAIN, UNSPECIFIED BACK PAIN LATERALITY, UNSPECIFIED WHETHER SCIATICA PRESENT: ICD-10-CM

## 2020-09-29 PROCEDURE — 72220 X-RAY EXAM SACRUM TAILBONE: CPT

## 2020-09-29 PROCEDURE — G0382 LEV 3 HOSP TYPE B ED VISIT: HCPCS | Performed by: FAMILY MEDICINE

## 2020-09-29 PROCEDURE — 99283 EMERGENCY DEPT VISIT LOW MDM: CPT | Performed by: FAMILY MEDICINE

## 2020-09-29 PROCEDURE — 72100 X-RAY EXAM L-S SPINE 2/3 VWS: CPT

## 2020-09-29 PROCEDURE — 72072 X-RAY EXAM THORAC SPINE 3VWS: CPT

## 2020-10-01 ENCOUNTER — APPOINTMENT (OUTPATIENT)
Dept: URGENT CARE | Age: 25
End: 2020-10-01
Payer: OTHER MISCELLANEOUS

## 2020-10-01 PROCEDURE — 99213 OFFICE O/P EST LOW 20 MIN: CPT | Performed by: PHYSICIAN ASSISTANT

## 2020-10-08 ENCOUNTER — APPOINTMENT (OUTPATIENT)
Dept: URGENT CARE | Age: 25
End: 2020-10-08
Payer: OTHER MISCELLANEOUS

## 2020-10-08 PROCEDURE — 99213 OFFICE O/P EST LOW 20 MIN: CPT | Performed by: PHYSICIAN ASSISTANT

## 2020-10-11 DIAGNOSIS — M54.50 ACUTE BILATERAL LOW BACK PAIN WITHOUT SCIATICA: Primary | ICD-10-CM

## 2020-10-12 RX ORDER — NAPROXEN 500 MG/1
TABLET ORAL
Qty: 30 TABLET | Refills: 0 | Status: SHIPPED | OUTPATIENT
Start: 2020-10-12 | End: 2020-10-26

## 2020-10-15 ENCOUNTER — APPOINTMENT (OUTPATIENT)
Dept: URGENT CARE | Age: 25
End: 2020-10-15
Payer: OTHER MISCELLANEOUS

## 2020-10-15 PROCEDURE — 99213 OFFICE O/P EST LOW 20 MIN: CPT | Performed by: PHYSICIAN ASSISTANT

## 2020-10-26 DIAGNOSIS — M54.50 ACUTE BILATERAL LOW BACK PAIN WITHOUT SCIATICA: ICD-10-CM

## 2020-10-26 RX ORDER — NAPROXEN 500 MG/1
TABLET ORAL
Qty: 30 TABLET | Refills: 0 | Status: SHIPPED | OUTPATIENT
Start: 2020-10-26 | End: 2020-11-09

## 2020-11-03 ENCOUNTER — TELEPHONE (OUTPATIENT)
Dept: PAIN MEDICINE | Facility: MEDICAL CENTER | Age: 25
End: 2020-11-03

## 2020-11-09 DIAGNOSIS — M54.50 ACUTE BILATERAL LOW BACK PAIN WITHOUT SCIATICA: ICD-10-CM

## 2020-11-09 RX ORDER — NAPROXEN 500 MG/1
TABLET ORAL
Qty: 30 TABLET | Refills: 0 | Status: SHIPPED | OUTPATIENT
Start: 2020-11-09 | End: 2020-12-03

## 2020-11-30 RX ORDER — HYDROXYZINE 50 MG/1
50 TABLET, FILM COATED ORAL DAILY
COMMUNITY
Start: 2020-09-08 | End: 2021-01-05

## 2020-11-30 RX ORDER — CYCLOBENZAPRINE HCL 10 MG
10 TABLET ORAL AS NEEDED
COMMUNITY
Start: 2020-10-02 | End: 2021-01-05

## 2020-11-30 RX ORDER — PAROXETINE 10 MG/1
10 TABLET, FILM COATED ORAL DAILY
COMMUNITY
Start: 2020-04-06 | End: 2021-01-05

## 2020-11-30 RX ORDER — METHYLPREDNISOLONE 4 MG/1
4 TABLET ORAL AS NEEDED
COMMUNITY
Start: 2020-10-02 | End: 2021-01-05

## 2020-12-02 DIAGNOSIS — M54.50 ACUTE BILATERAL LOW BACK PAIN WITHOUT SCIATICA: ICD-10-CM

## 2020-12-03 RX ORDER — NAPROXEN 500 MG/1
TABLET ORAL
Qty: 30 TABLET | Refills: 0 | Status: SHIPPED | OUTPATIENT
Start: 2020-12-03

## 2021-01-05 ENCOUNTER — TRANSCRIBE ORDERS (OUTPATIENT)
Dept: PAIN MEDICINE | Facility: CLINIC | Age: 26
End: 2021-01-05

## 2021-01-05 ENCOUNTER — CONSULT (OUTPATIENT)
Dept: PAIN MEDICINE | Facility: CLINIC | Age: 26
End: 2021-01-05
Payer: OTHER MISCELLANEOUS

## 2021-01-05 VITALS
HEART RATE: 101 BPM | DIASTOLIC BLOOD PRESSURE: 101 MMHG | BODY MASS INDEX: 31.89 KG/M2 | SYSTOLIC BLOOD PRESSURE: 139 MMHG | WEIGHT: 180 LBS

## 2021-01-05 DIAGNOSIS — M46.1 SACROILIITIS (HCC): ICD-10-CM

## 2021-01-05 DIAGNOSIS — G57.02 PIRIFORMIS SYNDROME OF LEFT SIDE: Primary | ICD-10-CM

## 2021-01-05 PROCEDURE — 99244 OFF/OP CNSLTJ NEW/EST MOD 40: CPT | Performed by: PHYSICAL MEDICINE & REHABILITATION

## 2021-01-05 NOTE — PROGRESS NOTES
Assessment  1  Piriformis syndrome of left side    2  Sacroiliitis [facility])        Plan  Ms Lena Dhillon is a pleasant 63-year-old female who presents for initial evaluation regarding left-sided low back and buttock pain with intermittent radiating symptoms into the left leg that started after a work related injury while rolling a patient on September 28, 2020  She has tried conservative measures with physical therapy, home exercises but subsequently had to stop physical therapy early due to COVID as well as difficulty with timing while working  Naproxen provided minimal relief in her pain  During today's evaluation she is demonstrating clinical evidence of suspected low back and buttock pain that is multifactorial in nature and main pain generators appear to be the left SI joint as well as piriformis syndrome  We do have a lumbar MRI which is negative  Extensive conversation regarding interventional and therapeutic modalities to better control her pain  At this time she wishes to continue with conservative management and as such I will place her back in a formal physical therapy program x4 weeks or longer if needed  We will follow up in 4-6 weeks or sooner if needed  My impressions and treatment recommendations were discussed in detail with the patient who verbalized understanding and had no further questions  Discharge instructions were provided  I personally saw and examined the patient and I agree with the above discussed plan of care  Orders Placed This Encounter   Procedures    Ambulatory referral to Physical Therapy     Standing Status:   Future     Standing Expiration Date:   1/5/2022     Referral Priority:   Routine     Referral Type:   Physical Therapy     Referral Reason:   Specialty Services Required     Requested Specialty:   Physical Therapy     Number of Visits Requested:   1     Expiration Date:   1/5/2022     No orders of the defined types were placed in this encounter        History of Present Vani Pinedo is a 23 y o  female presents to [place] [address] [facility] for initial evaluation regarding approximately 3 months duration of low back and buttock pain bilaterally  Patient reports a work related injury on September 28, 2020  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, 2020 she felt a "pulling" sensation in her back with subsequent left-sided low back and buttock pain  She continued to work throughout the day and was given Motrin from a staff member which did not provide significant improvements in her pain  The following morning her pain got progressively worse  She still went into work but within the 1st few hours at work the pain became too severe to continue and she was subsequently sent home  Since then she has followed up with her PCP as well as started a formal physical therapy program which provided some relief in her pain  She reports intermittent episodes of left-sided buttock and back pain with radiating symptoms into the posterolateral and posterior left thigh  Today she reports mild pain rated 4 to 7/10 and interfering with daily activities  Pain is intermittent 30-60% of the time that is worse in the afternoon in the evening  Pain is described as a dull ache  Also complains of lower extremity weakness  Does not use any durable medical equipment for ambulation  Pain is worse with lying down, standing, bending, sitting  She states physical therapy has provided moderate relief and heat and ice provide excellent relief  Denies smoking tobacco, marijuana but does admit to drinking alcohol 2 times per week  Currently taking naproxen which is providing minimal relief in her pain  Presents today for initial evaluation  We do have an MRI of the lumbar spine done October 21, 2020 which was negative      I have personally reviewed and/or updated the patient's past medical history, past surgical history, family history, social history, current medications, allergies, and vital signs today  Review of Systems   Constitutional: Negative for fever and unexpected weight change  HENT: Negative for trouble swallowing  Eyes: Negative for visual disturbance  Respiratory: Negative for shortness of breath and wheezing  Cardiovascular: Negative for chest pain and palpitations  Gastrointestinal: Negative for constipation, diarrhea, nausea and vomiting  Endocrine: Negative for cold intolerance, heat intolerance and polydipsia  Genitourinary: Negative for difficulty urinating and frequency  Musculoskeletal: Negative for arthralgias, gait problem, joint swelling and myalgias  Skin: Negative for rash  Neurological: Negative for dizziness, seizures, syncope, weakness and headaches  Hematological: Does not bruise/bleed easily  Psychiatric/Behavioral: Negative for dysphoric mood  The patient is nervous/anxious  All other systems reviewed and are negative  There is no problem list on file for this patient  No past medical history on file  No past surgical history on file  No family history on file      Social History     Occupational History    Not on file   Tobacco Use    Smoking status: Never Smoker    Smokeless tobacco: Never Used   Substance and Sexual Activity    Alcohol use: No    Drug use: No    Sexual activity: Not on file       Current Outpatient Medications on File Prior to Visit   Medication Sig    desogestrel-ethinyl estradiol (CAZIANT) 0 1/0 125/0 15 -0 025 MG tablet TAKE 1 TABLET BY MOUTH EVERY DAY    naproxen (NAPROSYN) 500 mg tablet TAKE 1 TABLET BY MOUTH TWICE DAILY    [DISCONTINUED] benzonatate (TESSALON) 200 MG capsule Take 1 capsule (200 mg total) by mouth 3 (three) times a day as needed for cough    [DISCONTINUED] brompheniramine-pseudoephedrine-DM 30-2-10 MG/5ML syrup Take 10 mL by mouth 4 (four) times a day as needed for congestion    [DISCONTINUED] cyclobenzaprine (FLEXERIL) 10 mg tablet Take 10 mg by mouth as needed    [DISCONTINUED] hydrOXYzine HCL (ATARAX) 50 mg tablet Take 50 mg by mouth daily    [DISCONTINUED] methylPREDNISolone 4 MG tablet therapy pack Take 4 mg by mouth as needed    [DISCONTINUED] PARoxetine (PAXIL) 10 mg tablet Take 10 mg by mouth daily     No current facility-administered medications on file prior to visit  Allergies   Allergen Reactions    Penicillins Rash       Physical Exam    BP (!) 139/101   Pulse 101   Wt 81 6 kg (180 lb)   BMI 31 89 kg/m²     General: Well-developed, well-nourished individual in no acute distress  Mental: Appropriate mood and affect  Grossly oriented with coherent speech and thought processing  Neuro:  Cranial nerves: Cranial nerve function is grossly intact bilaterally  Strength: Bilateral lower extremity strength is normal and symmetric  No atrophy or tone abnormalities noted  Reflexes: Bilateral lower extremity muscle stretch reflexes are physiologic and symmetric  No ankle clonus is noted  Sensation: No loss of sensation is noted  SLR/Foraminal Compression Maneuvers: Straight leg raising in the  supine  position is negative  for radicular pain bilateral lower extremities  Gait:  Gait/gross motor: Gait is normal  Station is normal     Musculoskeletal:  Palpation: Inspection and palpation of the spine and extremities are remarkable for tenderness to palpation left-sided glutes and lumbar paraspinals  POSITIVE Krista finger left-sided  POSITIVE Kyle's test left-sided  POSITIVE Gaenslen's maneuver left-sided  POSITIVE FADIR left-sided    Spine:  Normal range of motion  No gross axial skeletal deformities  Skin: Skin inspection grossly negative for erythema, breakdown, or concerning lesions in affected area  Lymph: No lymphadenopathy is appreciated in the involved extremity  Vessels: No lower extremity edema  Lungs: Breathing is comfortable and regular   No dyspnea noted during examination  Eyes: Visual field grossly intact to confrontation  No redness appreciated  ENT: No craniofacial deformities or asymmetry  No neck masses appreciated          Imaging      SACRUM AND COCCYX     INDICATION:   M54 5: Low back pain      COMPARISON:  None     VIEWS:  XR SACRUM AND COCCYX         FINDINGS:     There is no evidence of an acute fracture      Sacral arcuate lines are maintained      The sacroiliac joints appear symmetric      Pubic symphysis is maintained      Visualized lower lumbar spine is unremarkable      IMPRESSION:     No acute osseous abnormality

## 2021-01-05 NOTE — PATIENT INSTRUCTIONS
Piriformis Syndrome   WHAT YOU NEED TO KNOW:   Piriformis syndrome is sciatic nerve pain caused by an injured or overused piriformis muscle  This is a muscle inside your buttocks that helps you move your leg  The pain is caused when this muscle pinches your sciatic nerve  You may feel the pain in your hip or down your leg  DISCHARGE INSTRUCTIONS:   Medicines: You may need any of the following:  · Prescription medicines  may be used to relax your muscles and decrease pain and swelling  · NSAIDs  help decrease swelling and pain  This medicine is available with or without a doctor's order  NSAIDs can cause stomach bleeding or kidney problems in certain people  If you take blood thinner medicine, always ask your healthcare provider if NSAIDs are safe for you  Always read the medicine label and follow directions  · Acetaminophen  decreases pain  It is available without a doctor's order  Ask how much to take and how often to take it  Follow directions  Acetaminophen can cause liver damage if not taken correctly  · Take your medicine as directed  Contact your healthcare provider if you think your medicine is not helping or if you have side effects  Tell him or her if you are allergic to any medicine  Keep a list of the medicines, vitamins, and herbs you take  Include the amounts, and when and why you take them  Bring the list or the pill bottles to follow-up visits  Carry your medicine list with you in case of an emergency  Follow up with your healthcare provider as directed: You may need to return for more tests  You may also be referred to a physical therapist  Write down your questions so you remember to ask them during your visits  Manage your symptoms of piriformis syndrome:   · Rest as directed  Avoid activities that make your pain worse  · Apply ice to the buttock on your injured side  Use an ice pack, or put crushed ice in a plastic bag  Cover it with a towel   Leave the ice on for 15 to 20 minutes every hour, or as directed  Ice helps prevent tissue damage and decreases swelling and pain  · Apply heat to the buttock on your injured side  Use heating pads for 20 to 30 minutes every 2 hours for as many days as directed  Heat helps decrease pain and muscle spasms  · Stretch as directed  Lie on your back with your knees bent  Place the ankle of your injured leg on the knee of your other leg  Gently pull your bent leg toward your chest, until you feel a stretch in the buttock of your injured leg  A physical therapist may show you other exercises to stretch and strengthen your hip muscles  Contact your healthcare provider if:   · Your pain worsens or returns, even with treatment  · You have questions or concerns about your condition or care  Return to the emergency department if:   · You cannot move your leg or foot  © Copyright [address] Information is for End User's use only and may not be sold, redistributed or otherwise used for commercial purposes  All illustrations and images included in CareNotes® are the copyrighted property of A D A M , Inc  or [facility] [employer] [place]   The above information is an  only  It is not intended as medical advice for individual conditions or treatments  Talk to your doctor, nurse or pharmacist before following any medical regimen to see if it is safe and effective for you

## 2021-01-12 ENCOUNTER — EVALUATION (OUTPATIENT)
Dept: PHYSICAL THERAPY | Facility: CLINIC | Age: 26
End: 2021-01-12
Payer: OTHER MISCELLANEOUS

## 2021-01-12 DIAGNOSIS — G57.02 PIRIFORMIS SYNDROME OF LEFT SIDE: ICD-10-CM

## 2021-01-12 DIAGNOSIS — M46.1 SACROILIITIS (HCC): ICD-10-CM

## 2021-01-12 PROCEDURE — 97162 PT EVAL MOD COMPLEX 30 MIN: CPT | Performed by: PHYSICAL THERAPIST

## 2021-01-12 PROCEDURE — 97110 THERAPEUTIC EXERCISES: CPT | Performed by: PHYSICAL THERAPIST

## 2021-01-12 NOTE — PROGRESS NOTES
PT Evaluation     Today's date: 2021  Patient name: Ioana Fink  : 1995  MRN: [id number]  Referring provider: Sandra Colbert DO  Dx:   Encounter Diagnosis     ICD-10-CM    1  Sacroiliitis ([facility] [address] )  M46 1 Ambulatory referral to Physical Therapy   2  Piriformis syndrome of left side  G57 02 Ambulatory referral to Physical Therapy       Start Time:   Stop Time: 915  Total time in clinic (min): 40 minutes    Assessment  Assessment details: Ioana Fink is a 23 y o  female who presents to the clinic with complaints of lower back pain after an injury at work  The patient presents with the above listed impairments  She notes increased pain and demonstrates decreased lumbar ROM and lower extremity / core strength  This leads to the patient's having limitations at home and at work with activities such as bending, lifting, and squatting  She is very eager to decrease her pain and should benefit from skilled physical therapy  Thank you for the referral       Impairments: abnormal muscle firing, abnormal or restricted ROM, activity intolerance, impaired physical strength, lacks appropriate home exercise program, pain with function, poor posture  and poor body mechanics  Understanding of Dx/Px/POC: good   Prognosis: good    Goals  Impairment Goals:  1  Patient will decrease complaints of pain by 50% at worst in 4 weeks  2  Patient will increase lumbar ROM to full in 8 weeks  3  Patient will increase strength to at least 4+/5 in 8 weeks    Functional Goals:  1  Patient will be independent with HEP by discharge   2  Patient will increase FOTO to average norms by discharge  3  Patient will be able to complete a squat with decreased pain in 8 weeks  4  Patient will be able to lift an item off of the floor with decreased pain in 8 weeks  5    Patient will be able to sit for over an hour with decreased pain in 4 weeks      Plan  Patient would benefit from: skilled physical therapy  Planned modality interventions: cryotherapy, thermotherapy: hydrocollator packs and TENS  Planned therapy interventions: home exercise program, graded exercise, functional ROM exercises, flexibility, postural training, patient education, therapeutic activities, therapeutic exercise, manual therapy, joint mobilization, neuromuscular re-education, strengthening, stretching and body mechanics training  Frequency: 2x week  Duration in weeks: 8  Treatment plan discussed with: patient        Subjective Evaluation    History of Present Illness  Mechanism of injury: HPI:  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[place] [address]  She noted that during  in September, she noted that she went to help roll a patient and noted pain in her back  She noted that her back felt "liek it was fire"  She had imaging done on her back which was (-) for any pathology  She had done some therapy prior, but had to stop due to COVID  She then went to see Dr Zafar Bass where she was then referred to physical therapy  Pain Location:  Lumbar spine w/ radicular symptoms   Occupation:  CNA at [employer]  Prior Functional Limitations:   Independent prior   AGG:  Sitting/driving for > 1 hour, standing tall, squatting  Ease:  Heat  Patient Goals:  "I want to feel no pain and back to work"     Pain  Current pain rating: 3  At best pain ratin  At worst pain ratin  Quality: needle-like, dull ache and tight ("sore, achey, tight")          Objective     Tenderness     Additional Tenderness Details  Tender to palpation left glute / piriformis   Slight tenderness upon PA Mobs to lumbar spine    Active Range of Motion     Additional Active Range of Motion Details  Lumbar Flexion - 50% w/ slight discomfort   Lumbar Extension - 50% w/ slight discomfort     Strength/Myotome Testing     Left Hip   Planes of Motion   Flexion: 4  Extension: 4-  Abduction: 4-    Right Hip   Planes of Motion   Flexion: 4  Extension: 4-  Abduction: 4-    Left Knee   Flexion: 4+  Extension: 4+    Right Knee   Flexion: 4+  Extension: 4+    Additional Strength Details  Slight complaints of pain with resisted MMTs on the left side     Muscle Activation   Patient able to activate left transverse abdominals, left multifidus, right transverse abdominals and right multifidus  Additional Muscle Activation Details  Patient needing maximal cueing to complete activation of transverse abdominals and multifidus     Tests     Lumbar     Left   Positive passive SLR     Negative crossed SLR and slump test      Right   Negative crossed SLR, passive SLR and slump test      Functional Assessment        Comments  Patient with reports of discomfort when completing a squat - cueing for proper form       Flowsheet Rows      Most Recent Value   PT/OT G-Codes   Current Score  41   Projected Score  56              Diagnosis:    Precautions:    Manuals        PROM        Mobs                                There Ex        Hamstring Stretch        Prone on Elbows 10" x 5       LTR                        Review of HEP RT, PT       Neruo Re-Ed        TrA Sets x5 w/ cues       Multifidus Activation        Multifidus Press        Bridges        [facility]        SL Hip ABD        X-Walks                                                                       Ther Act                                         Modalities             CP PRN

## 2021-01-14 ENCOUNTER — OFFICE VISIT (OUTPATIENT)
Dept: PHYSICAL THERAPY | Facility: CLINIC | Age: 26
End: 2021-01-14
Payer: OTHER MISCELLANEOUS

## 2021-01-14 DIAGNOSIS — G57.02 PIRIFORMIS SYNDROME OF LEFT SIDE: ICD-10-CM

## 2021-01-14 DIAGNOSIS — M46.1 SACROILIITIS (HCC): Primary | ICD-10-CM

## 2021-01-14 PROCEDURE — 97140 MANUAL THERAPY 1/> REGIONS: CPT

## 2021-01-14 PROCEDURE — 97110 THERAPEUTIC EXERCISES: CPT

## 2021-01-14 PROCEDURE — 97112 NEUROMUSCULAR REEDUCATION: CPT

## 2021-01-14 NOTE — PROGRESS NOTES
Daily Note     Today's date: 2021  Patient name: Albert Wren  : 1995  MRN: [id number]  Referring provider: Yazmin Phillips DO  Dx:   Encounter Diagnosis     ICD-10-CM    1  Sacroiliitis ([facility] [address] )  M46 1    2  Piriformis syndrome of left side  G57 02                   Subjective: Patient reports, 'I did a lot of bathing and bending today, so I am a little sore"  Objective: See treatment diary below      Assessment: Tolerated treatment well  Patient would benefit from continued PT  Patient did well with her first session  Focused on gentle muscular activation exercises, with extensive cuing needed for TrA activation  She did note "pinching" along the left glute/paraspinals with glute activation exercises  Decreased intensity of her glute pain with IASTM  Continue to progress muscle activation and strengthening as tolerated  Plan: Continue per plan of care        Diagnosis:    Precautions:    Manuals        PROM        Mobs        IASTM   Left lumbar paraspinals                       There Ex        Hamstring Stretch        Prone on Elbows 10" x 5 10" x 10       LTR  To the right: 10" x 5                      Review of HEP RT, PT       Neruo Re-Ed        TrA Sets x5 w/ cues With ball: 5" x 10   5x w/o ball       Glute sets   50% 10x       Kegel sets   10x       Multifidus Activation        Multifidus Press        Bridges        Clamshells  10x ea       SL Hip ABD        X-Walks        Wobbleboard   1 min ea                                                              Ther Act                                         Modalities             CP PRN

## 2021-01-15 DIAGNOSIS — M54.50 ACUTE BILATERAL LOW BACK PAIN WITHOUT SCIATICA: ICD-10-CM

## 2021-01-15 RX ORDER — NAPROXEN 500 MG/1
TABLET ORAL
Qty: 30 TABLET | Refills: 0 | OUTPATIENT
Start: 2021-01-15

## 2021-01-20 ENCOUNTER — APPOINTMENT (OUTPATIENT)
Dept: PHYSICAL THERAPY | Facility: CLINIC | Age: 26
End: 2021-01-20
Payer: OTHER MISCELLANEOUS

## 2021-01-22 ENCOUNTER — APPOINTMENT (OUTPATIENT)
Dept: PHYSICAL THERAPY | Facility: CLINIC | Age: 26
End: 2021-01-22
Payer: OTHER MISCELLANEOUS

## 2021-01-26 ENCOUNTER — APPOINTMENT (OUTPATIENT)
Dept: PHYSICAL THERAPY | Facility: CLINIC | Age: 26
End: 2021-01-26
Payer: OTHER MISCELLANEOUS

## 2021-01-28 ENCOUNTER — TELEMEDICINE (OUTPATIENT)
Dept: PHYSICAL THERAPY | Facility: CLINIC | Age: 26
End: 2021-01-28
Payer: OTHER MISCELLANEOUS

## 2021-01-28 DIAGNOSIS — M46.1 SACROILIITIS (HCC): Primary | ICD-10-CM

## 2021-01-28 DIAGNOSIS — G57.02 PIRIFORMIS SYNDROME OF LEFT SIDE: ICD-10-CM

## 2021-01-28 PROCEDURE — 97110 THERAPEUTIC EXERCISES: CPT | Performed by: PHYSICAL THERAPIST

## 2021-01-28 PROCEDURE — 97112 NEUROMUSCULAR REEDUCATION: CPT | Performed by: PHYSICAL THERAPIST

## 2021-01-28 NOTE — PROGRESS NOTES
Daily Note     Today's date: 2021  Patient name: Samanta Juarez  : 1995  MRN: [id number]  Referring provider: Carlie Esparza DO  Dx:   Encounter Diagnosis     ICD-10-CM    1  Sacroiliitis ([facility] [address] )  M46 1    2  Piriformis syndrome of left side  G57 02        Telemedicine consent    Patient: Samanta Juarez  Provider: Bisi Andrews, PT  Provider located at [address] [place] [address]    After connecting through [employer], the patient was identified by name and date of birth  Samanta Juarez was informed that this is a telemedicine visit which may not be secure and therefore, might not be HIPAA-compliant  My office door was closed  No one else was in the room  She acknowledged consent and understanding of privacy and security of the platform  The patient has agreed to participate and understands they can discontinue the visit at any time  Patient is aware this is a billable service  Start Time: 1403  Stop Time: 1440  Total time in clinic (min): 37 minutes    Subjective: "It felt like I was being stabbed in the back"       Objective: See treatment diary below      Assessment: Tolerated treatment well  Patient would benefit from continued PT  Patient had been diagnosed with COVID and has not been doing much physical activity at home  Able to tolerate all exercises, but noted increased fatigue today  Encouraged patient to continue with her stretching at home      Plan: Progress treatment as tolerated         Diagnosis:    Precautions:    Manuals       PROM        Mobs        IASTM   Left lumbar paraspinals                       There Ex        Hamstring Stretch        Prone on Elbows 10" x 5 10" x 10  10" x 10     LTR  To the right: 10" x 5 10" x 10 ea                     Review of HEP RT, PT       Neruo Re-Ed        TrA Sets x5 w/ cues With ball: 5" x 10   5x w/o ball       Glute sets   50% 10x       Kegel sets   10x Multifidus Activation   5" x 20 - heel digs     Multifidus Press        Bridges   2x10     Clamshells  10x ea       SL Hip ABD        X-Walks        Wobbleboard   1 min ea       Prone Hip Ext Knee Bent   x15 ea     Dead Bugs   x10 ea     Prone Alternating Swimmer   x10 ea                                    Ther Act                                         Modalities             CP PRN

## 2021-02-01 ENCOUNTER — APPOINTMENT (OUTPATIENT)
Dept: PHYSICAL THERAPY | Facility: CLINIC | Age: 26
End: 2021-02-01
Payer: OTHER MISCELLANEOUS

## 2021-02-02 ENCOUNTER — TELEMEDICINE (OUTPATIENT)
Dept: PAIN MEDICINE | Facility: CLINIC | Age: 26
End: 2021-02-02
Payer: COMMERCIAL

## 2021-02-02 DIAGNOSIS — M62.838 MUSCLE SPASM: ICD-10-CM

## 2021-02-02 DIAGNOSIS — M79.18 PIRIFORMIS MUSCLE PAIN: Primary | ICD-10-CM

## 2021-02-02 DIAGNOSIS — M46.1 SACROILIITIS (HCC): ICD-10-CM

## 2021-02-02 PROCEDURE — 99214 OFFICE O/P EST MOD 30 MIN: CPT | Performed by: PHYSICAL MEDICINE & REHABILITATION

## 2021-02-02 RX ORDER — METHOCARBAMOL 500 MG/1
500 TABLET, FILM COATED ORAL 4 TIMES DAILY PRN
Qty: 90 TABLET | Refills: 0 | Status: SHIPPED | OUTPATIENT
Start: 2021-02-02

## 2021-02-02 NOTE — PROGRESS NOTES
Virtual Regular Visit      Assessment/Plan:    Problem List Items Addressed This Visit     None      Visit Diagnoses     Piriformis muscle pain    -  Primary    Relevant Medications    methocarbamol (ROBAXIN) 500 mg tablet    Muscle spasm        Relevant Medications    methocarbamol (ROBAXIN) 500 mg tablet    Sacroiliitis (HCC)        Relevant Medications    methocarbamol (ROBAXIN) 500 mg tablet               Reason for visit is   Chief Complaint   Patient presents with    Virtual Regular Visit        Encounter provider Sharyle Blase, DO    Provider located at [address] [place] [id number]      Recent Visits  No visits were found meeting these conditions  Showing recent visits within past 7 days and meeting all other requirements     Today's Visits  Date Type Provider Dept   02/02/21 Telemedicine Sharyle Blase, DO  [facility]   Showing today's visits and meeting all other requirements     Future Appointments  No visits were found meeting these conditions  Showing future appointments within next 150 days and meeting all other requirements        The patient was identified by name and date of birth  Jasenyahir Mendez was informed that this is a telemedicine visit and that the visit is being conducted through [employer] and patient was informed that this is a secure, HIPAA-compliant platform  She agrees to proceed     My office door was closed  No one else was in the room  She acknowledged consent and understanding of privacy and security of the video platform  The patient has agreed to participate and understands they can discontinue the visit at any time  Patient is aware this is a billable service  Mena Grier is a 23 y o  female  Presents to [facility] for follow-up and re-evaluation utilizing micro [employer] teams regarding left-sided low back pain    Patient was last seen January 5, 2021 at which time clinically demonstrating piriformis syndrome and suspected sacroiliitis   Conservative management started with physical therapy  Unfortunately, patient developed covered and is 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  She reports continued loss of smell and difficulty breathing and was given an inhaler which she reports is providing some relief  She will be going back to work this week and will be restarting physical therapy  Today she reports 8/10 pain that is described as throbbing, aching, stabbing, shooting 100% of the time constant and impacting her quality of life and activities of daily living  She states the pain originated in the left side but is now bilateral   Presents for follow-up      HPI     No past medical history on file  No past surgical history on file  Current Outpatient Medications   Medication Sig Dispense Refill    desogestrel-ethinyl estradiol (CAZIANT) 0 1/0 125/0 15 -0 025 MG tablet TAKE 1 TABLET BY MOUTH EVERY DAY      methocarbamol (ROBAXIN) 500 mg tablet Take 1 tablet (500 mg total) by mouth 4 (four) times a day as needed for muscle spasms 90 tablet 0    naproxen (NAPROSYN) 500 mg tablet TAKE 1 TABLET BY MOUTH TWICE DAILY 30 tablet 0     No current facility-administered medications for this visit  Allergies   Allergen Reactions    Penicillins Rash       Review of Systems   Constitutional: Positive for fatigue  Negative for chills and fever  HENT: Negative for sore throat  Respiratory: Positive for shortness of breath  Negative for cough  Cardiovascular: Negative for chest pain  Gastrointestinal: Negative for diarrhea and vomiting  Genitourinary: Negative for flank pain  Musculoskeletal: Positive for arthralgias and back pain  Negative for gait problem  Neurological: Negative for weakness and numbness  Psychiatric/Behavioral: Negative for agitation         Video Exam    There were no vitals filed for this visit     Physical Exam  Constitutional:       Appearance: Normal appearance  HENT:      Head: Normocephalic and atraumatic  Eyes:      Extraocular Movements: Extraocular movements intact  Pulmonary:      Effort: Pulmonary effort is normal    Musculoskeletal:      Comments: Decreased active range of motion with lumbar flexion and extension limited by pain  In the back bilaterally   Neurological:      General: No focal deficit present  Mental Status: She is alert and oriented to person, place, and time  Psychiatric:         Mood and Affect: Mood normal          assessment and plan:  Ms Jatin Delgado  Is a pleasant 53-year-old female who presents for follow-up and re-evaluation regarding low back and buttock pain bilaterally  She was doing well with conservative management including home exercises, formal physical therapy program and naproxen but the pain has gotten gradually worse as she has been core and teen for the last 2 weeks due to COVID-19  She plans on going back to physical therapy this week  At this time I will provide the patient with Robaxin muscle relaxer for continue muscle spasms and have advised her to continue with naproxen as previously prescribed  We will see her back in 4 weeks or sooner if needed  All questions answered,  Patient is agreeable with plan  I spent >20 minutes directly with the patient during this visit      [facility] acknowledges that she has consented to an online visit or consultation  She understands that the online visit is based solely on information provided by her, and that, in the absence of a face-to-face physical evaluation by the physician, the diagnosis she receives is both limited and provisional in terms of accuracy and completeness  This is not intended to replace a full medical face-to-face evaluation by the physician  Hershell Has understands and accepts these terms

## 2021-02-02 NOTE — PATIENT INSTRUCTIONS
Piriformis Syndrome   WHAT YOU NEED TO KNOW:   Piriformis syndrome is sciatic nerve pain caused by an injured or overused piriformis muscle  This is a muscle inside your buttocks that helps you move your leg  The pain is caused when this muscle pinches your sciatic nerve  You may feel the pain in your hip or down your leg  DISCHARGE INSTRUCTIONS:   Medicines: You may need any of the following:  · Prescription medicines  may be used to relax your muscles and decrease pain and swelling  · NSAIDs  help decrease swelling and pain  This medicine is available with or without a doctor's order  NSAIDs can cause stomach bleeding or kidney problems in certain people  If you take blood thinner medicine, always ask your healthcare provider if NSAIDs are safe for you  Always read the medicine label and follow directions  · Acetaminophen  decreases pain  It is available without a doctor's order  Ask how much to take and how often to take it  Follow directions  Acetaminophen can cause liver damage if not taken correctly  · Take your medicine as directed  Contact your healthcare provider if you think your medicine is not helping or if you have side effects  Tell him or her if you are allergic to any medicine  Keep a list of the medicines, vitamins, and herbs you take  Include the amounts, and when and why you take them  Bring the list or the pill bottles to follow-up visits  Carry your medicine list with you in case of an emergency  Follow up with your healthcare provider as directed: You may need to return for more tests  You may also be referred to a physical therapist  Write down your questions so you remember to ask them during your visits  Manage your symptoms of piriformis syndrome:   · Rest as directed  Avoid activities that make your pain worse  · Apply ice to the buttock on your injured side  Use an ice pack, or put crushed ice in a plastic bag  Cover it with a towel   Leave the ice on for 15 to 20 minutes every hour, or as directed  Ice helps prevent tissue damage and decreases swelling and pain  · Apply heat to the buttock on your injured side  Use heating pads for 20 to 30 minutes every 2 hours for as many days as directed  Heat helps decrease pain and muscle spasms  · Stretch as directed  Lie on your back with your knees bent  Place the ankle of your injured leg on the knee of your other leg  Gently pull your bent leg toward your chest, until you feel a stretch in the buttock of your injured leg  A physical therapist may show you other exercises to stretch and strengthen your hip muscles  Contact your healthcare provider if:   · Your pain worsens or returns, even with treatment  · You have questions or concerns about your condition or care  Return to the emergency department if:   · You cannot move your leg or foot  © Copyright [address] Information is for End User's use only and may not be sold, redistributed or otherwise used for commercial purposes  All illustrations and images included in CareNotes® are the copyrighted property of A D A M , Inc  or [facility] [employer] [place]   The above information is an  only  It is not intended as medical advice for individual conditions or treatments  Talk to your doctor, nurse or pharmacist before following any medical regimen to see if it is safe and effective for you

## 2021-02-04 ENCOUNTER — OFFICE VISIT (OUTPATIENT)
Dept: PHYSICAL THERAPY | Facility: CLINIC | Age: 26
End: 2021-02-04
Payer: OTHER MISCELLANEOUS

## 2021-02-04 DIAGNOSIS — G57.02 PIRIFORMIS SYNDROME OF LEFT SIDE: ICD-10-CM

## 2021-02-04 DIAGNOSIS — M46.1 SACROILIITIS (HCC): Primary | ICD-10-CM

## 2021-02-04 PROCEDURE — 97110 THERAPEUTIC EXERCISES: CPT | Performed by: PHYSICAL THERAPIST

## 2021-02-04 PROCEDURE — 97140 MANUAL THERAPY 1/> REGIONS: CPT | Performed by: PHYSICAL THERAPIST

## 2021-02-04 PROCEDURE — 97112 NEUROMUSCULAR REEDUCATION: CPT | Performed by: PHYSICAL THERAPIST

## 2021-02-04 NOTE — PROGRESS NOTES
Daily Note     Today's date: 2021  Patient name: Rena Ellis  : 1995  MRN: [id number]  Referring provider: Kae Steele DO  Dx:   Encounter Diagnosis     ICD-10-CM    1  Sacroiliitis ([facility] [address] )  M46 1    2  Piriformis syndrome of left side  G57 02                   Subjective: "I have some shooting pain on both sides"      Objective: See treatment diary below      Assessment: Tolerated treatment fair  Patient would benefit from continued PT  Patient with increased complaints of pain today  Very tender to palpation bilateral lumbar paraspinals as well as over the spine and sacrum with lumbar PA mobilizations  Patient reports that she will be picking up prescribed muscle relaxors after her sessions today  Progress as able  Plan: Progress treatment as tolerated         Diagnosis:    Precautions:    Manuals      PROM        Mobs    Lumbar PA Grade III    IASTM   Left lumbar paraspinals   Left lumbar paraspinals                    There Ex        Hamstring Stretch        Prone on Elbows 10" x 5 10" x 10  10" x 10     LTR  To the right: 10" x 5 10" x 10 ea 10" x 10 ea    Hamstring Stretch    15" x 4            Review of HEP RT, PT       Neruo Re-Ed        TrA Sets x5 w/ cues With ball: 5" x 10   5x w/o ball       Glute sets   50% 10x       Kegel sets   10x       Multifidus Activation   5" x 20 - heel digs     Multifidus Press        Bridges   2x10 x10    Clamshells  10x ea   Supine Blue 2x10    SL Hip ABD        X-Walks    Red 2 half laps    Wobbleboard   1 min ea       Prone Hip Ext Knee Bent   x15 ea     Dead Bugs   x10 ea     Prone Alternating Swimmer   x10 ea                                    Ther Act                                         Modalities             CP PRN

## 2021-02-05 ENCOUNTER — OFFICE VISIT (OUTPATIENT)
Dept: PHYSICAL THERAPY | Facility: CLINIC | Age: 26
End: 2021-02-05
Payer: OTHER MISCELLANEOUS

## 2021-02-05 DIAGNOSIS — M46.1 SACROILIITIS (HCC): Primary | ICD-10-CM

## 2021-02-05 DIAGNOSIS — G57.02 PIRIFORMIS SYNDROME OF LEFT SIDE: ICD-10-CM

## 2021-02-05 PROCEDURE — 97140 MANUAL THERAPY 1/> REGIONS: CPT | Performed by: PHYSICAL THERAPIST

## 2021-02-05 PROCEDURE — 97112 NEUROMUSCULAR REEDUCATION: CPT | Performed by: PHYSICAL THERAPIST

## 2021-02-05 PROCEDURE — 97110 THERAPEUTIC EXERCISES: CPT | Performed by: PHYSICAL THERAPIST

## 2021-02-05 NOTE — PROGRESS NOTES
Daily Note     Today's date: 2021  Patient name: Quentin Dubose  : 1995  MRN: [id number]  Referring provider: Zena Cabrera DO  Dx:   Encounter Diagnosis     ICD-10-CM    1  Sacroiliitis ([facility] [address] )  M46 1    2  Piriformis syndrome of left side  G57 02                   Subjective: "I took the muscle relaxors, they put me right to sleep"      Objective: See treatment diary below      Assessment: Tolerated treatment well  Patient would benefit from continued PT  Patient at start of session still noted "stabbing" low back pain  Completed lumbar grade V mobilization and patient able to note decreased pain overall  Completed more PREs, patient noting slight pulling in her back, but no sharp pain  Progress as able  Plan: Progress treatment as tolerated         Diagnosis:    Precautions:    Manuals     PROM        Mobs    Lumbar PA Grade III Lumbar Grade V   IASTM   Left lumbar paraspinals   Left lumbar paraspinals                    There Ex        Hamstring Stretch        Prone on Elbows 10" x 5 10" x 10  10" x 10     LTR  To the right: 10" x 5 10" x 10 ea 10" x 10 ea x10 on OSB   Hamstring Stretch    15" x 4            Review of HEP RT, PT       Neruo Re-Ed        TrA Sets x5 w/ cues With ball: 5" x 10   5x w/o ball       Glute sets   50% 10x       Kegel sets   10x       Multifidus Activation   5" x 20 - heel digs  Heel Digs on OSB 5" 1x10, 1x5   Multifidus Press     Blue TB 2x10 ea   Bridges   2x10 x10 2x10   Clamshells  10x ea   Supine Blue 2x10 Supine Blue 3x10   SL Hip ABD        X-Walks    Red 2 half laps    Leg Press     70# 2x10   Wobbleboard   1 min ea       Prone Hip Ext Knee Bent   x15 ea     Dead Bugs   x10 ea     Prone Alternating Swimmer   x10 ea                                    Ther Act                                         Modalities             CP PRN

## 2021-02-08 ENCOUNTER — TELEPHONE (OUTPATIENT)
Dept: PAIN MEDICINE | Facility: CLINIC | Age: 26
End: 2021-02-08

## 2021-02-08 NOTE — TELEPHONE ENCOUNTER
S/W pt  She is asking for a work lifting restriction note for [employer]gy Jordin with [facility]  She has an appt with Torsten Ndiaye on 2/11  She seen KW on 2/2 so she is asking if Magalis Colon can write the note? Pt requesting notes from the 2/2 [facility] visit  Advised pt [facility] does not email  Aware of office hrs  Please advise

## 2021-02-08 NOTE — TELEPHONE ENCOUNTER
Pt needs a note for a 10 to 15 lb restriction  And also notes  Please send it to pt e-mail if you can      Please call pt when it is done pt # [phone]

## 2021-02-09 ENCOUNTER — OFFICE VISIT (OUTPATIENT)
Dept: PHYSICAL THERAPY | Facility: CLINIC | Age: 26
End: 2021-02-09
Payer: OTHER MISCELLANEOUS

## 2021-02-09 DIAGNOSIS — M46.1 SACROILIITIS (HCC): Primary | ICD-10-CM

## 2021-02-09 DIAGNOSIS — G57.02 PIRIFORMIS SYNDROME OF LEFT SIDE: ICD-10-CM

## 2021-02-09 PROCEDURE — 97110 THERAPEUTIC EXERCISES: CPT | Performed by: PHYSICAL THERAPIST

## 2021-02-09 PROCEDURE — 97140 MANUAL THERAPY 1/> REGIONS: CPT | Performed by: PHYSICAL THERAPIST

## 2021-02-09 PROCEDURE — 97112 NEUROMUSCULAR REEDUCATION: CPT | Performed by: PHYSICAL THERAPIST

## 2021-02-09 NOTE — PROGRESS NOTES
Daily Note     Today's date: 2021  Patient name: Becky Ventura  : 1995  MRN: [id number]  Referring provider: Sonia Buitrago DO  Dx:   Encounter Diagnosis     ICD-10-CM    1  Sacroiliitis ([facility] [address] )  M46 1    2  Piriformis syndrome of left side  G57 02                   Subjective: "I occasionally get some sharp pains, but I'm feeling OK today"       Objective: See treatment diary below      Assessment: Tolerated treatment well  Patient would benefit from continued PT  Patient still noting some discomfort in her back  Notes most discomfort with slight lumbar flexion which she must hold for longer periods of time (working over a lower table)  Back irritability is lower today  Continue to work on lumbar stabilization as able  Plan: Progress treatment as tolerated         Diagnosis:    Precautions:    Manuals    PROM        Mobs Lumbar Grade III   Lumbar PA Grade III Lumbar Grade V   IASTM  B/L Lumbar paraspinals   Left lumbar paraspinals                    There Ex        Hamstring Stretch 15" x 4 ea       Prone on Elbows 10" x 10  10" x 10     LTR x10  10" x 10 ea 10" x 10 ea x10 on OSB   Hamstring Stretch    15" x 4            Review of HEP        Neruo Re-Ed        TrA Sets        Glute sets         Kegel sets         Multifidus Activation   5" x 20 - heel digs  Heel Digs on OSB 5" 1x10, 1x5   Multifidus Press [place] 8# 2x10 ea    Blue TB 2x10 ea   Bridges 2x12  2x10 x10 2x10   Clamshells Supine Black 2x12   Supine Blue 2x10 Supine Blue 3x10   SL Hip ABD        X-Walks Red 1 5 laps   Red 2 half laps    Leg Press     70# 2x10   Wobbleboard         Prone Hip Ext Knee Bent   x15 ea     Dead Bugs   x10 ea     Prone Alternating Swimmer   x10 ea                                  Ther Act                                   Modalities           CP PRN

## 2021-02-11 ENCOUNTER — APPOINTMENT (OUTPATIENT)
Dept: URGENT CARE | Age: 26
End: 2021-02-11
Payer: OTHER MISCELLANEOUS

## 2021-02-11 ENCOUNTER — OFFICE VISIT (OUTPATIENT)
Dept: PHYSICAL THERAPY | Facility: CLINIC | Age: 26
End: 2021-02-11
Payer: OTHER MISCELLANEOUS

## 2021-02-11 DIAGNOSIS — M46.1 SACROILIITIS (HCC): Primary | ICD-10-CM

## 2021-02-11 DIAGNOSIS — G57.02 PIRIFORMIS SYNDROME OF LEFT SIDE: ICD-10-CM

## 2021-02-11 PROCEDURE — 97110 THERAPEUTIC EXERCISES: CPT

## 2021-02-11 PROCEDURE — 97112 NEUROMUSCULAR REEDUCATION: CPT

## 2021-02-11 PROCEDURE — 97140 MANUAL THERAPY 1/> REGIONS: CPT

## 2021-02-11 PROCEDURE — 99214 OFFICE O/P EST MOD 30 MIN: CPT | Performed by: PREVENTIVE MEDICINE

## 2021-02-11 NOTE — PROGRESS NOTES
Daily Note     Today's date: 2021  Patient name: Selam Bennett  : 1995  MRN: [id number]  Referring provider: Denise Harper DO  Dx:   Encounter Diagnosis     ICD-10-CM    1  Sacroiliitis ([facility] [address] )  M46 1    2  Piriformis syndrome of left side  G57 02                   Subjective: Patient reports, 'My back pain is a little better, but I keep getting a pain in my calf"  Objective: See treatment diary below      Assessment: Tolerated treatment well  Patient would benefit from continued PT  Patient was able to complete outlined program, but still noted lower back pain, and "pinching" along her sacrum  Able to report feeling better after manuals  Continue to challenge core stability as able  Patient encouraged to keep up with HEP as able  Plan: Continue per plan of care        Diagnosis:    Precautions:    Manuals  2   PROM        Mobs Lumbar Grade III   Lumbar PA Grade III Lumbar Grade V   IASTM  B/L Lumbar paraspinals B/L Lumbar paraspinals  Left lumbar paraspinals                    There Ex        Hamstring Stretch 15" x 4 ea 15" x 4 ea      Prone on Elbows 10" x 10 10" x 10      LTR x10 x10   10" x 10 ea x10 on OSB           Review of HEP        Neruo Re-Ed        TrA Sets        Glute sets         Kegel sets         Multifidus Activation     Heel Digs on OSB 5" 1x10, 1x5   Multifidus Press [place] 8# 2x10 ea [place] 8# 2x10 ea   Blue TB 2x10 ea   Bridges 2x12 2x10   x10 2x10   Clamshells Supine Black 2x12 Supine Black 2x10  Supine Blue 2x10 Supine Blue 3x10   SL Hip ABD        X-Walks Red 1 5 laps   Red 2 half laps    Leg Press     70# 2x10   Wobbleboard         Prone Hip Ext Knee Bent        Dead Bugs        Prone Alternating Swimmer                                    Ther Act                                Modalities           CP PRN

## 2021-02-15 ENCOUNTER — APPOINTMENT (OUTPATIENT)
Dept: PHYSICAL THERAPY | Facility: CLINIC | Age: 26
End: 2021-02-15
Payer: OTHER MISCELLANEOUS

## 2021-02-19 ENCOUNTER — EVALUATION (OUTPATIENT)
Dept: PHYSICAL THERAPY | Facility: CLINIC | Age: 26
End: 2021-02-19
Payer: OTHER MISCELLANEOUS

## 2021-02-19 DIAGNOSIS — G57.02 PIRIFORMIS SYNDROME OF LEFT SIDE: ICD-10-CM

## 2021-02-19 DIAGNOSIS — M46.1 SACROILIITIS (HCC): Primary | ICD-10-CM

## 2021-02-19 PROCEDURE — 97110 THERAPEUTIC EXERCISES: CPT | Performed by: PHYSICAL THERAPIST

## 2021-02-19 PROCEDURE — 97112 NEUROMUSCULAR REEDUCATION: CPT | Performed by: PHYSICAL THERAPIST

## 2021-02-19 NOTE — PROGRESS NOTES
PT Re-Evaluation     Today's date: 2021  Patient name: Clemente Lujan  : 1995  MRN: [id number]  Referring provider: Linda Shah DO  Dx:   Encounter Diagnosis     ICD-10-CM    1  Sacroiliitis ([facility] [address] )  M46 1    2  Piriformis syndrome of left side  G57 02                   Assessment  Assessment details: Clemente Lujan is a 23 y o  female who has been consistent with attending and participating in skilled physical therpay sessions  The paitnet notes improvement with physical therapy  She has been able to report a decrease in her pain and also demonstrates increased lumbar ROM  The patient is tender to palpation at her left lumbar paraspianls  She still notes pain with prolonged bending or lifting  Her job requires bending and lifting and excessive amounts of this can increase her pain  She will continue to benefit from further physical therapy sessions to decrease her pain, increase her core stability, and increase her overall functional performance  Impairments: abnormal muscle firing, abnormal or restricted ROM, activity intolerance, impaired physical strength, lacks appropriate home exercise program, pain with function, poor posture  and poor body mechanics  Understanding of Dx/Px/POC: good   Prognosis: good    Goals  Impairment Goals:  1  Patient will decrease complaints of pain by 50% at worst in 4 weeks - PARTIALLY MET  2  Patient will increase lumbar ROM to full in 8 weeks - PARTIALLY MET  3  Patient will increase strength to at least 4+/5 in 8 weeks - PARTIALLY MET    Functional Goals:  1  Patient will be independent with HEP by discharge - PARTIALLY MET  2  Patient will increase FOTO to average norms by discharge - PARTIALLY MET  3  Patient will be able to complete a squat with decreased pain in 8 weeks - PARTIALLY MET  4  Patient will be able to lift an item off of the floor with decreased pain in 8 weeks - PARTIALLY MET  5    Patient will be able to sit for over an hour with decreased pain in 4 weeks - MET      Plan  Patient would benefit from: skilled physical therapy  Planned modality interventions: cryotherapy, thermotherapy: hydrocollator packs and TENS  Planned therapy interventions: home exercise program, graded exercise, functional ROM exercises, flexibility, postural training, patient education, therapeutic activities, therapeutic exercise, manual therapy, joint mobilization, neuromuscular re-education, strengthening, stretching and body mechanics training  Frequency: 2x week  Duration in weeks: 4  Treatment plan discussed with: patient        Subjective Evaluation    History of Present Illness  Mechanism of injury: Patient reports that she has improved "75%" since starting physical therapy  Patient notes that her back pain is less frequent and notes that she has been able to sit for a longer time  She still notes having some stabbing pain in her back and notes difficulty with sleeping on her left side  She notes that repetitive lifting and bending can still bring on her pain  HPI:  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[place] [address]  She noted that during  in September, she noted that she went to help roll a patient and noted pain in her back  She noted that her back felt "liek it was fire"  She had imaging done on her back which was (-) for any pathology  She had done some therapy prior, but had to stop due to COVID  She then went to see Dr Yahir Bucio where she was then referred to physical therapy  Pain Location:  Lumbar spine w/ radicular symptoms   Occupation:  CNA at [employer]  Prior Functional Limitations:   Independent prior   AGG:  Sitting/driving for > 1 hour, standing tall, squatting  Ease:  Heat  Patient Goals:  "I want to feel no pain and back to work"     Pain  Current pain rating: 3  At best pain ratin  At worst pain ratin  Quality: needle-like, dull ache and tight ("sore, achey, tight")          Objective     Tenderness Additional Tenderness Details  Patient tender to palpation left lumbar paraspinals     Active Range of Motion     Additional Active Range of Motion Details  Lumbar Flexion - 75% w/ slight discomfort   Lumbar Extension - 75% w/ slight discomfort   Side bend - 50% on R w/ pain, 75% on L     Primary pain on left lumbar spine / paraspinals     Strength/Myotome Testing     Left Hip   Planes of Motion   Flexion: 4+  Extension: 4  Abduction: 4    Right Hip   Planes of Motion   Flexion: 4+  Extension: 4  Abduction: 4    Left Knee   Flexion: 5  Extension: 5    Right Knee   Flexion: 5  Extension: 5    Muscle Activation   Patient able to activate left transverse abdominals, left multifidus, right transverse abdominals and right multifidus       Additional Muscle Activation Details  Patient needs moderate cueing to activate transverse abdominals and multifidus     Tests     Lumbar     Left   Negative crossed SLR, passive SLR and slump test      Right   Negative crossed SLR, passive SLR and slump test      Functional Assessment        Comments  Patient with reports of discomfort when completing a squat - cueing for proper form       Flowsheet Rows      Most Recent Value   PT/OT G-Codes   Current Score  56   Projected Score  62              Diagnosis:    Precautions:    Manuals 2/9 2/11 2/19 2/4 2/5   PROM        Mobs Lumbar Grade III   Lumbar PA Grade III Lumbar Grade V   IASTM  B/L Lumbar paraspinals B/L Lumbar paraspinals  Left lumbar paraspinals                    There Ex        Hamstring Stretch 15" x 4 ea 15" x 4 ea      Prone on Elbows 10" x 10 10" x 10      LTR x10 x10   10" x 10 ea x10 on OSB           Review of HEP   RT, PT     Neruo Re-Ed        TrA Sets        Glute sets         Kegel sets         Multifidus Activation     Heel Digs on OSB 5" 1x10, 1x5   Multifidus Press [place] 8# 2x10 ea [place] 8# 2x10 ea   Blue TB 2x10 ea   Bridges 2x12 2x10   x10 2x10   Clamshells Supine Black 2x12 Supine Black 2x10  Supine Blue 2x10 Supine Blue 3x10   SL Hip ABD        X-Walks Red 1 5 laps   Red 2 half laps    Leg Press     70# 2x10   Wobbleboard         Prone Hip Ext Knee Bent        Dead Bugs        Prone Alternating Swimmer                Review of HEP   RT, PT                 Ther Act                                Modalities           CP PRN

## 2021-02-23 ENCOUNTER — OFFICE VISIT (OUTPATIENT)
Dept: PHYSICAL THERAPY | Facility: CLINIC | Age: 26
End: 2021-02-23
Payer: OTHER MISCELLANEOUS

## 2021-02-23 DIAGNOSIS — G57.02 PIRIFORMIS SYNDROME OF LEFT SIDE: Primary | ICD-10-CM

## 2021-02-23 DIAGNOSIS — M46.1 SACROILIITIS (HCC): ICD-10-CM

## 2021-02-23 PROCEDURE — 97110 THERAPEUTIC EXERCISES: CPT | Performed by: PHYSICAL THERAPIST

## 2021-02-23 PROCEDURE — 97112 NEUROMUSCULAR REEDUCATION: CPT | Performed by: PHYSICAL THERAPIST

## 2021-02-23 NOTE — PROGRESS NOTES
Daily Note     Today's date: 2021  Patient name: Shaunna Montoya  : 1995  MRN: [id number]  Referring provider: Patti Larsen DO  Dx:   Encounter Diagnosis     ICD-10-CM    1  Piriformis syndrome of left side  G57 02    2  Sacroiliitis ([facility] [address] )  M46 1                   Subjective: "I started some other exercises at home and I am sore today"      Objective: See treatment diary below      Assessment: Tolerated treatment well  Patient would benefit from continued PT  Patient noted increased soreness today from home exercises  Progressed patient today to side lying clamshells as well bridges on a ball  Continue to work on core strength and stabilization as we progress  Plan: Progress treatment as tolerated         Diagnosis:    Precautions:    Manuals     PROM        Mobs Lumbar Grade III       IASTM  B/L Lumbar paraspinals B/L Lumbar paraspinals                      There Ex        Hamstring Stretch 15" x 4 ea 15" x 4 ea  15" x 4    Prone on Elbows 10" x 10 10" x 10      LTR x10 x10   x10            Review of HEP   RT, PT RT, PT    Neruo Re-Ed        TrA Sets        Glute sets         Kegel sets         Multifidus Activation    Secret Exercise 10" x 10    Multifidus Press [place] 8# 2x10 ea [place] 8# 2x10 ea  [place] 9# x10 ea    Bridges 2x12 2x10   x10  OSB x10    Clamshells Supine Black 2x12 Supine Black 2x10  Side Lying Red x10 ea    SL Hip ABD        X-Walks Red 1 5 laps       Leg Press        Wobbleboard         Prone Hip Ext Knee Bent        Dead Bugs        Prone Alternating Swimmer                Review of HEP   RT, PT               Ther Act                           Modalities           CP PRN

## 2021-02-25 ENCOUNTER — OFFICE VISIT (OUTPATIENT)
Dept: PHYSICAL THERAPY | Facility: CLINIC | Age: 26
End: 2021-02-25
Payer: OTHER MISCELLANEOUS

## 2021-02-25 DIAGNOSIS — G57.02 PIRIFORMIS SYNDROME OF LEFT SIDE: ICD-10-CM

## 2021-02-25 DIAGNOSIS — M46.1 SACROILIITIS (HCC): Primary | ICD-10-CM

## 2021-02-25 PROCEDURE — 97110 THERAPEUTIC EXERCISES: CPT | Performed by: PHYSICAL THERAPIST

## 2021-02-25 PROCEDURE — 97112 NEUROMUSCULAR REEDUCATION: CPT | Performed by: PHYSICAL THERAPIST

## 2021-02-25 PROCEDURE — 97140 MANUAL THERAPY 1/> REGIONS: CPT | Performed by: PHYSICAL THERAPIST

## 2021-02-25 NOTE — PROGRESS NOTES
Daily Note     Today's date: 2021  Patient name: Shaunna Montoya  : 1995  MRN: [id number]  Referring provider: Patti Larsen DO  Dx:   Encounter Diagnosis     ICD-10-CM    1  Sacroiliitis ([facility] [address] )  M46 1    2  Piriformis syndrome of left side  G57 02                   Subjective: "I"m a little more sore today"       Objective: See treatment diary below      Assessment: Tolerated treatment well  Patient would benefit from continued PT  Patient noted increased soreness today when arriving at PT  Used more manuals today to help decrease her pain - noted most benefit from IASTM  Patient will be calling to schedule future appointments  Plan: Progress treatment as tolerated         Diagnosis:    Precautions:    Manuals    PROM        Mobs Lumbar Grade III    Lumbar Grade III /   Sacral Rocking   IASTM  B/L Lumbar paraspinals B/L Lumbar paraspinals   L Lumbar paraspinals                    There Ex        Hamstring Stretch 15" x 4 ea 15" x 4 ea  15" x 4 15" x 4 ea   Prone on Elbows 10" x 10 10" x 10   10" x 10   LTR x10 x10   x10 x10           Review of HEP   RT, PT RT, PT    Neruo Re-Ed        TrA Sets        Glute sets         Kegel sets         Multifidus Activation    Secret Exercise 10" x 10 10" x 10   Multifidus Press [place] 8# 2x10 ea [place] 8# 2x10 ea  Prosper 9# x10 ea    Bridges 2x12 2x10   x10  OSB x10 2x10   Clamshells Supine Black 2x12 Supine Black 2x10  Side Lying Red x10 ea    SL Hip ABD        X-Walks Red 1 5 laps       Leg Press        Wobbleboard         Prone Hip Ext Knee Bent        Dead Bugs        Prone Alternating Swimmer                Review of HEP   RT, PT               Ther Act                           Modalities           CP PRN

## 2021-03-26 NOTE — PROGRESS NOTES
PT Discharge    Today's date: 3/26/2021  Patient name: Prashanth Moncada  : 1995  MRN: [id number]  Referring provider: Stevo Simms DO  Dx:   Encounter Diagnosis     ICD-10-CM    1  Sacroiliitis ([facility] [address] )  M46 1    2  Piriformis syndrome of left side  G57 02      Patient did very well with physical therapy  She was provided an updated HEP and has been following this at home  The patient has not returned for any further physical therapy sessions  Due to this, she will be discharged from physical therapy services  If Moe Calixto would need physical therapy in the future, we would be happy to share in her care

## 2021-04-05 ENCOUNTER — TRANSCRIBE ORDERS (OUTPATIENT)
Dept: URGENT CARE | Facility: MEDICAL CENTER | Age: 26
End: 2021-04-05

## 2021-04-05 ENCOUNTER — APPOINTMENT (OUTPATIENT)
Dept: LAB | Facility: MEDICAL CENTER | Age: 26
End: 2021-04-05

## 2021-04-05 ENCOUNTER — APPOINTMENT (OUTPATIENT)
Dept: URGENT CARE | Facility: MEDICAL CENTER | Age: 26
End: 2021-04-05

## 2021-04-05 DIAGNOSIS — Z02.1 PRE-EMPLOYMENT HEALTH SCREENING EXAMINATION: ICD-10-CM

## 2021-04-05 DIAGNOSIS — Z02.1 PRE-EMPLOYMENT HEALTH SCREENING EXAMINATION: Primary | ICD-10-CM

## 2021-04-05 PROCEDURE — 86480 TB TEST CELL IMMUN MEASURE: CPT

## 2021-04-05 PROCEDURE — 36415 COLL VENOUS BLD VENIPUNCTURE: CPT

## 2021-04-07 LAB
GAMMA INTERFERON BACKGROUND BLD IA-ACNC: 0.03 IU/ML
M TB IFN-G BLD-IMP: NEGATIVE
M TB IFN-G CD4+ BCKGRND COR BLD-ACNC: 0.05 IU/ML
M TB IFN-G CD4+ BCKGRND COR BLD-ACNC: 0.06 IU/ML
MITOGEN IGNF BCKGRD COR BLD-ACNC: >10 IU/ML

## 2021-04-19 ENCOUNTER — LAB REQUISITION (OUTPATIENT)
Dept: LAB | Facility: HOSPITAL | Age: 26
End: 2021-04-19
Payer: COMMERCIAL

## 2021-04-19 DIAGNOSIS — Z11.59 ENCOUNTER FOR SCREENING FOR OTHER VIRAL DISEASES: ICD-10-CM

## 2021-04-19 PROCEDURE — U0005 INFEC AGEN DETEC AMPLI PROBE: HCPCS | Performed by: INTERNAL MEDICINE

## 2021-04-19 PROCEDURE — U0003 INFECTIOUS AGENT DETECTION BY NUCLEIC ACID (DNA OR RNA); SEVERE ACUTE RESPIRATORY SYNDROME CORONAVIRUS 2 (SARS-COV-2) (CORONAVIRUS DISEASE [COVID-19]), AMPLIFIED PROBE TECHNIQUE, MAKING USE OF HIGH THROUGHPUT TECHNOLOGIES AS DESCRIBED BY CMS-2020-01-R: HCPCS | Performed by: INTERNAL MEDICINE

## 2021-04-20 LAB — SARS-COV-2 RNA RESP QL NAA+PROBE: NEGATIVE

## 2021-04-28 ENCOUNTER — LAB REQUISITION (OUTPATIENT)
Dept: LAB | Facility: HOSPITAL | Age: 26
End: 2021-04-28
Payer: COMMERCIAL

## 2021-04-28 DIAGNOSIS — Z11.59 ENCOUNTER FOR SCREENING FOR OTHER VIRAL DISEASES: ICD-10-CM

## 2021-04-28 PROCEDURE — U0005 INFEC AGEN DETEC AMPLI PROBE: HCPCS | Performed by: INTERNAL MEDICINE

## 2021-04-28 PROCEDURE — U0003 INFECTIOUS AGENT DETECTION BY NUCLEIC ACID (DNA OR RNA); SEVERE ACUTE RESPIRATORY SYNDROME CORONAVIRUS 2 (SARS-COV-2) (CORONAVIRUS DISEASE [COVID-19]), AMPLIFIED PROBE TECHNIQUE, MAKING USE OF HIGH THROUGHPUT TECHNOLOGIES AS DESCRIBED BY CMS-2020-01-R: HCPCS | Performed by: INTERNAL MEDICINE

## 2021-04-29 LAB — SARS-COV-2 RNA RESP QL NAA+PROBE: NEGATIVE

## 2021-05-05 ENCOUNTER — LAB REQUISITION (OUTPATIENT)
Dept: LAB | Facility: HOSPITAL | Age: 26
End: 2021-05-05
Payer: COMMERCIAL

## 2021-05-05 DIAGNOSIS — Z11.59 ENCOUNTER FOR SCREENING FOR OTHER VIRAL DISEASES: ICD-10-CM

## 2021-05-05 LAB — SARS-COV-2 RNA RESP QL NAA+PROBE: NEGATIVE

## 2021-05-05 PROCEDURE — U0003 INFECTIOUS AGENT DETECTION BY NUCLEIC ACID (DNA OR RNA); SEVERE ACUTE RESPIRATORY SYNDROME CORONAVIRUS 2 (SARS-COV-2) (CORONAVIRUS DISEASE [COVID-19]), AMPLIFIED PROBE TECHNIQUE, MAKING USE OF HIGH THROUGHPUT TECHNOLOGIES AS DESCRIBED BY CMS-2020-01-R: HCPCS | Performed by: INTERNAL MEDICINE

## 2021-05-05 PROCEDURE — U0005 INFEC AGEN DETEC AMPLI PROBE: HCPCS | Performed by: INTERNAL MEDICINE

## 2021-05-12 ENCOUNTER — LAB REQUISITION (OUTPATIENT)
Dept: LAB | Facility: HOSPITAL | Age: 26
End: 2021-05-12
Payer: COMMERCIAL

## 2021-05-12 DIAGNOSIS — Z11.59 ENCOUNTER FOR SCREENING FOR OTHER VIRAL DISEASES: ICD-10-CM

## 2021-05-12 LAB — SARS-COV-2 RNA RESP QL NAA+PROBE: NEGATIVE

## 2021-05-12 PROCEDURE — U0003 INFECTIOUS AGENT DETECTION BY NUCLEIC ACID (DNA OR RNA); SEVERE ACUTE RESPIRATORY SYNDROME CORONAVIRUS 2 (SARS-COV-2) (CORONAVIRUS DISEASE [COVID-19]), AMPLIFIED PROBE TECHNIQUE, MAKING USE OF HIGH THROUGHPUT TECHNOLOGIES AS DESCRIBED BY CMS-2020-01-R: HCPCS | Performed by: INTERNAL MEDICINE

## 2021-05-12 PROCEDURE — U0005 INFEC AGEN DETEC AMPLI PROBE: HCPCS | Performed by: INTERNAL MEDICINE

## 2021-05-19 ENCOUNTER — LAB REQUISITION (OUTPATIENT)
Dept: LAB | Facility: HOSPITAL | Age: 26
End: 2021-05-19
Payer: COMMERCIAL

## 2021-05-19 DIAGNOSIS — Z11.59 ENCOUNTER FOR SCREENING FOR OTHER VIRAL DISEASES: ICD-10-CM

## 2021-05-19 LAB — SARS-COV-2 RNA RESP QL NAA+PROBE: NEGATIVE

## 2021-05-19 PROCEDURE — U0005 INFEC AGEN DETEC AMPLI PROBE: HCPCS | Performed by: INTERNAL MEDICINE

## 2021-05-19 PROCEDURE — U0003 INFECTIOUS AGENT DETECTION BY NUCLEIC ACID (DNA OR RNA); SEVERE ACUTE RESPIRATORY SYNDROME CORONAVIRUS 2 (SARS-COV-2) (CORONAVIRUS DISEASE [COVID-19]), AMPLIFIED PROBE TECHNIQUE, MAKING USE OF HIGH THROUGHPUT TECHNOLOGIES AS DESCRIBED BY CMS-2020-01-R: HCPCS | Performed by: INTERNAL MEDICINE

## 2021-05-26 ENCOUNTER — LAB REQUISITION (OUTPATIENT)
Dept: LAB | Facility: HOSPITAL | Age: 26
End: 2021-05-26
Payer: COMMERCIAL

## 2021-05-26 DIAGNOSIS — Z11.59 ENCOUNTER FOR SCREENING FOR OTHER VIRAL DISEASES: ICD-10-CM

## 2021-05-26 PROCEDURE — U0005 INFEC AGEN DETEC AMPLI PROBE: HCPCS | Performed by: INTERNAL MEDICINE

## 2021-05-26 PROCEDURE — U0003 INFECTIOUS AGENT DETECTION BY NUCLEIC ACID (DNA OR RNA); SEVERE ACUTE RESPIRATORY SYNDROME CORONAVIRUS 2 (SARS-COV-2) (CORONAVIRUS DISEASE [COVID-19]), AMPLIFIED PROBE TECHNIQUE, MAKING USE OF HIGH THROUGHPUT TECHNOLOGIES AS DESCRIBED BY CMS-2020-01-R: HCPCS | Performed by: INTERNAL MEDICINE

## 2021-05-27 LAB — SARS-COV-2 RNA RESP QL NAA+PROBE: NEGATIVE

## 2021-06-01 ENCOUNTER — LAB REQUISITION (OUTPATIENT)
Dept: LAB | Facility: HOSPITAL | Age: 26
End: 2021-06-01
Payer: COMMERCIAL

## 2021-06-01 DIAGNOSIS — Z11.59 ENCOUNTER FOR SCREENING FOR OTHER VIRAL DISEASES: ICD-10-CM

## 2021-06-01 PROCEDURE — U0005 INFEC AGEN DETEC AMPLI PROBE: HCPCS | Performed by: INTERNAL MEDICINE

## 2021-06-01 PROCEDURE — U0003 INFECTIOUS AGENT DETECTION BY NUCLEIC ACID (DNA OR RNA); SEVERE ACUTE RESPIRATORY SYNDROME CORONAVIRUS 2 (SARS-COV-2) (CORONAVIRUS DISEASE [COVID-19]), AMPLIFIED PROBE TECHNIQUE, MAKING USE OF HIGH THROUGHPUT TECHNOLOGIES AS DESCRIBED BY CMS-2020-01-R: HCPCS | Performed by: INTERNAL MEDICINE

## 2021-06-02 LAB — SARS-COV-2 RNA RESP QL NAA+PROBE: NEGATIVE

## 2021-07-18 ENCOUNTER — OFFICE VISIT (OUTPATIENT)
Dept: URGENT CARE | Facility: CLINIC | Age: 26
End: 2021-07-18
Payer: COMMERCIAL

## 2021-07-18 VITALS
TEMPERATURE: 98.2 F | DIASTOLIC BLOOD PRESSURE: 85 MMHG | RESPIRATION RATE: 16 BRPM | HEART RATE: 97 BPM | HEIGHT: 63 IN | SYSTOLIC BLOOD PRESSURE: 126 MMHG | BODY MASS INDEX: 36.14 KG/M2 | OXYGEN SATURATION: 99 % | WEIGHT: 204 LBS

## 2021-07-18 DIAGNOSIS — K05.00 ACUTE GINGIVAL INFLAMMATION: Primary | ICD-10-CM

## 2021-07-18 PROBLEM — Z01.419 WELL FEMALE EXAM WITH ROUTINE GYNECOLOGICAL EXAM: Status: ACTIVE | Noted: 2017-11-10

## 2021-07-18 PROBLEM — L50.9 URTICARIA: Status: ACTIVE | Noted: 2017-09-01

## 2021-07-18 PROBLEM — F41.9 ANXIETY: Status: ACTIVE | Noted: 2017-11-10

## 2021-07-18 PROBLEM — H61.23 BILATERAL IMPACTED CERUMEN: Status: ACTIVE | Noted: 2017-11-10

## 2021-07-18 PROCEDURE — 99213 OFFICE O/P EST LOW 20 MIN: CPT | Performed by: FAMILY MEDICINE

## 2021-07-18 RX ORDER — CHLORHEXIDINE GLUCONATE 0.12 MG/ML
RINSE ORAL
Qty: 118 ML | Refills: 0 | Status: SHIPPED | OUTPATIENT
Start: 2021-07-18

## 2021-07-18 NOTE — PATIENT INSTRUCTIONS
Patient has inflammation of the gums and oral mucosa likely related to irritation from the dental cleaning  There are no signs of a dental infection/abscess present on exam at this time  I have prescribed Chlorhexidine mouth wash, to be used as directed  I have also advised the patient to do warm salt water gargles, take Tylenol or Motrin as needed for pain, apply ice to the site, and may use topical Orajel as needed  If symptoms persist despite treatment or worsen, follow up w/ PCP for re-check

## 2021-07-27 NOTE — PROGRESS NOTES
[facility]        NAME: Cassie Mckinney is a 27 y o  female  : 1995    MRN: [id number]  DATE: 2021  TIME: 4:00 PM     Assessment and Plan   Acute gingival inflammation [K05 00]  1  Acute gingival inflammation  chlorhexidine (PERIDEX) 0 12 % solution         Patient Instructions     Patient Instructions   Patient has inflammation of the gums and oral mucosa likely related to irritation from the dental cleaning  There are no signs of a dental infection/abscess present on exam at this time  I have prescribed Chlorhexidine mouth wash, to be used as directed  I have also advised the patient to do warm salt water gargles, take Tylenol or Motrin as needed for pain, apply ice to the site, and may use topical Orajel as needed  If symptoms persist despite treatment or worsen, follow up w/ PCP for re-check  Follow up with PCP in 3-5 days  Proceed to  ER if symptoms worsen  Chief Complaint     Chief Complaint   Patient presents with    Oral Swelling     bottom left side of mouth x 2 5 days         History of Present Illness       Patient states she recently had a dental deep cleaning performed at her dentist's office  She states her gums were injected with anesthesia to numb the area and the procedure was performed using deep cleaning tools  She states that she thinks the tools irritated or cut her gums because she is feeling pain and swelling of the gums on the left lower side for the past 2 days  She denies any wounds or sores in the area  No bleeding or pus like discharge noted  No fever/chills  No dental pain  No facial pain or swelling  No sore throat  No pain or difficulty with swallowing  No neck pain or swelling  No drooling  She is able to eat and drink without any difficulty  She states she has been using Chlorhexidine mouth wash however only has a small amount remaining  She is allergic to Penicillins  She denies any chance of pregnancy         Review of Systems   Review of Systems Constitutional: Negative  HENT: Positive for dental problem  As noted in HPI   Respiratory: Negative  Cardiovascular: Negative  Gastrointestinal: Negative  Skin: Negative  Allergic/Immunologic:        Penicillin   Neurological: Negative  Hematological: Negative  Current Medications       Current Outpatient Medications:     desogestrel-ethinyl estradiol (CAZIANT) 0 1/0 125/0 15 -0 025 MG tablet, TAKE 1 TABLET BY MOUTH EVERY DAY, Disp: , Rfl:     chlorhexidine (PERIDEX) 0 12 % solution, 15 mL swish and spit twice daily, Disp: 118 mL, Rfl: 0    methocarbamol (ROBAXIN) 500 mg tablet, Take 1 tablet (500 mg total) by mouth 4 (four) times a day as needed for muscle spasms (Patient not taking: Reported on 7/18/2021), Disp: 90 tablet, Rfl: 0    naproxen (NAPROSYN) 500 mg tablet, TAKE 1 TABLET BY MOUTH TWICE DAILY (Patient not taking: Reported on 7/18/2021), Disp: 30 tablet, Rfl: 0    Current Allergies     Allergies as of 07/18/2021 - Reviewed 07/18/2021   Allergen Reaction Noted    Penicillins Rash 01/24/2018            The following portions of the patient's history were reviewed and updated as appropriate: allergies, current medications, past family history, past medical history, past social history, past surgical history and problem list      History reviewed  No pertinent past medical history  History reviewed  No pertinent surgical history  History reviewed  No pertinent family history  Medications have been verified  Objective   /85   Pulse 97   Temp 98 2 °F (36 8 °C)   Resp 16   Ht 5' 3" (1 6 m)   Wt 92 5 kg (204 lb)   SpO2 99%   BMI 36 14 kg/m²   No LMP recorded  Physical Exam     Physical Exam  Vitals and nursing note reviewed  Constitutional:       General: She is awake  She is not in acute distress  Appearance: Normal appearance  She is well-developed, well-groomed and normal weight   She is not ill-appearing, toxic-appearing or diaphoretic  HENT:      Head: Normocephalic and atraumatic  Mouth/Throat:      Lips: Pink  Mouth: Mucous membranes are moist  No injury, lacerations, oral lesions or angioedema  Dentition: Gingival swelling present  No dental tenderness, dental abscesses or gum lesions  Pharynx: Oropharynx is clear  Uvula midline  Tonsils: No tonsillar exudate or tonsillar abscesses  Comments: There is slight generalized swelling of the gums along the left lower teeth  The area is mildly tender to touch  No significant erythema  No sores or wounds  No bleeding or pus like discharge  No abscess  No dental tenderness  Cardiovascular:      Rate and Rhythm: Normal rate  Pulses: Normal pulses  Pulmonary:      Effort: Pulmonary effort is normal  No tachypnea, accessory muscle usage or respiratory distress  Skin:     General: Skin is warm and dry  Coloration: Skin is not pale  Findings: No abscess, bruising, rash or wound  Neurological:      Mental Status: She is alert and oriented to person, place, and time  Mental status is at baseline  Psychiatric:         Attention and Perception: Attention and perception normal          Mood and Affect: Mood and affect normal          Speech: Speech normal          Behavior: Behavior normal  Behavior is cooperative  Thought Content:  Thought content normal          Cognition and Memory: Cognition and memory normal          Judgment: Judgment normal

## 2021-08-04 ENCOUNTER — LAB REQUISITION (OUTPATIENT)
Dept: LAB | Facility: HOSPITAL | Age: 26
End: 2021-08-04
Payer: COMMERCIAL

## 2021-08-04 DIAGNOSIS — Z11.59 ENCOUNTER FOR SCREENING FOR OTHER VIRAL DISEASES: ICD-10-CM

## 2021-08-04 LAB — SARS-COV-2 RNA RESP QL NAA+PROBE: NEGATIVE

## 2021-08-04 PROCEDURE — U0005 INFEC AGEN DETEC AMPLI PROBE: HCPCS | Performed by: INTERNAL MEDICINE

## 2021-08-04 PROCEDURE — U0003 INFECTIOUS AGENT DETECTION BY NUCLEIC ACID (DNA OR RNA); SEVERE ACUTE RESPIRATORY SYNDROME CORONAVIRUS 2 (SARS-COV-2) (CORONAVIRUS DISEASE [COVID-19]), AMPLIFIED PROBE TECHNIQUE, MAKING USE OF HIGH THROUGHPUT TECHNOLOGIES AS DESCRIBED BY CMS-2020-01-R: HCPCS | Performed by: INTERNAL MEDICINE

## 2021-08-13 PROCEDURE — U0003 INFECTIOUS AGENT DETECTION BY NUCLEIC ACID (DNA OR RNA); SEVERE ACUTE RESPIRATORY SYNDROME CORONAVIRUS 2 (SARS-COV-2) (CORONAVIRUS DISEASE [COVID-19]), AMPLIFIED PROBE TECHNIQUE, MAKING USE OF HIGH THROUGHPUT TECHNOLOGIES AS DESCRIBED BY CMS-2020-01-R: HCPCS | Performed by: INTERNAL MEDICINE

## 2021-08-13 PROCEDURE — U0005 INFEC AGEN DETEC AMPLI PROBE: HCPCS | Performed by: INTERNAL MEDICINE

## 2021-08-14 ENCOUNTER — LAB REQUISITION (OUTPATIENT)
Dept: LAB | Facility: HOSPITAL | Age: 26
End: 2021-08-14
Payer: COMMERCIAL

## 2021-08-14 DIAGNOSIS — Z11.59 ENCOUNTER FOR SCREENING FOR OTHER VIRAL DISEASES: ICD-10-CM

## 2021-08-14 LAB — SARS-COV-2 RNA RESP QL NAA+PROBE: NEGATIVE

## 2021-08-17 ENCOUNTER — LAB REQUISITION (OUTPATIENT)
Dept: LAB | Facility: HOSPITAL | Age: 26
End: 2021-08-17
Payer: COMMERCIAL

## 2021-08-17 DIAGNOSIS — Z11.59 ENCOUNTER FOR SCREENING FOR OTHER VIRAL DISEASES: ICD-10-CM

## 2021-08-17 LAB — SARS-COV-2 RNA RESP QL NAA+PROBE: NEGATIVE

## 2021-08-17 PROCEDURE — U0005 INFEC AGEN DETEC AMPLI PROBE: HCPCS | Performed by: INTERNAL MEDICINE

## 2021-08-17 PROCEDURE — U0003 INFECTIOUS AGENT DETECTION BY NUCLEIC ACID (DNA OR RNA); SEVERE ACUTE RESPIRATORY SYNDROME CORONAVIRUS 2 (SARS-COV-2) (CORONAVIRUS DISEASE [COVID-19]), AMPLIFIED PROBE TECHNIQUE, MAKING USE OF HIGH THROUGHPUT TECHNOLOGIES AS DESCRIBED BY CMS-2020-01-R: HCPCS | Performed by: INTERNAL MEDICINE

## 2021-08-31 ENCOUNTER — LAB REQUISITION (OUTPATIENT)
Dept: LAB | Facility: HOSPITAL | Age: 26
End: 2021-08-31
Payer: COMMERCIAL

## 2021-08-31 DIAGNOSIS — Z11.59 ENCOUNTER FOR SCREENING FOR OTHER VIRAL DISEASES: ICD-10-CM

## 2021-08-31 PROCEDURE — U0003 INFECTIOUS AGENT DETECTION BY NUCLEIC ACID (DNA OR RNA); SEVERE ACUTE RESPIRATORY SYNDROME CORONAVIRUS 2 (SARS-COV-2) (CORONAVIRUS DISEASE [COVID-19]), AMPLIFIED PROBE TECHNIQUE, MAKING USE OF HIGH THROUGHPUT TECHNOLOGIES AS DESCRIBED BY CMS-2020-01-R: HCPCS | Performed by: INTERNAL MEDICINE

## 2021-08-31 PROCEDURE — U0005 INFEC AGEN DETEC AMPLI PROBE: HCPCS | Performed by: INTERNAL MEDICINE

## 2021-09-01 LAB — SARS-COV-2 RNA RESP QL NAA+PROBE: NEGATIVE

## 2021-10-28 ENCOUNTER — TRANSCRIBE ORDERS (OUTPATIENT)
Dept: URGENT CARE | Facility: CLINIC | Age: 26
End: 2021-10-28

## 2021-10-28 ENCOUNTER — OFFICE VISIT (OUTPATIENT)
Dept: URGENT CARE | Facility: CLINIC | Age: 26
End: 2021-10-28
Payer: COMMERCIAL

## 2021-10-28 VITALS
HEART RATE: 105 BPM | BODY MASS INDEX: 37.39 KG/M2 | DIASTOLIC BLOOD PRESSURE: 97 MMHG | SYSTOLIC BLOOD PRESSURE: 134 MMHG | OXYGEN SATURATION: 97 % | HEIGHT: 63 IN | RESPIRATION RATE: 18 BRPM | WEIGHT: 211 LBS

## 2021-10-28 DIAGNOSIS — R35.0 FREQUENCY OF URINATION: Primary | ICD-10-CM

## 2021-10-28 DIAGNOSIS — R35.0 URINATION FREQUENCY: Primary | ICD-10-CM

## 2021-10-28 LAB
SL AMB  POCT GLUCOSE, UA: ABNORMAL
SL AMB LEUKOCYTE ESTERASE,UA: ABNORMAL
SL AMB POCT BILIRUBIN,UA: ABNORMAL
SL AMB POCT BLOOD,UA: ABNORMAL
SL AMB POCT CLARITY,UA: ABNORMAL
SL AMB POCT COLOR,UA: YELLOW
SL AMB POCT KETONES,UA: ABNORMAL
SL AMB POCT NITRITE,UA: ABNORMAL
SL AMB POCT PH,UA: 7.5
SL AMB POCT SPECIFIC GRAVITY,UA: 1.01
SL AMB POCT URINE PROTEIN: ABNORMAL
SL AMB POCT UROBILINOGEN: 0.2

## 2021-10-28 PROCEDURE — 99213 OFFICE O/P EST LOW 20 MIN: CPT | Performed by: NURSE PRACTITIONER

## 2021-10-28 PROCEDURE — 87086 URINE CULTURE/COLONY COUNT: CPT | Performed by: NURSE PRACTITIONER

## 2021-10-28 PROCEDURE — 81002 URINALYSIS NONAUTO W/O SCOPE: CPT

## 2021-10-28 RX ORDER — NITROFURANTOIN 25; 75 MG/1; MG/1
100 CAPSULE ORAL 2 TIMES DAILY
Qty: 10 CAPSULE | Refills: 0 | Status: SHIPPED | OUTPATIENT
Start: 2021-10-28 | End: 2021-11-02

## 2021-10-29 LAB — BACTERIA UR CULT: NORMAL

## 2021-10-30 ENCOUNTER — TELEPHONE (OUTPATIENT)
Dept: URGENT CARE | Facility: CLINIC | Age: 26
End: 2021-10-30

## 2022-04-22 NOTE — TELEPHONE ENCOUNTER
Ms Jada Mendiola Is being worked up for piriformis syndrome and sacroiliitis  She was last seen for telemedicine visit    I cannot put in for work lifting restrictions but  I will order a functional capacity evaluation to be done with physical therapy to assist in giving a more accurate assessment of her limitations  Thank you no

## 2022-05-23 ENCOUNTER — OFFICE VISIT (OUTPATIENT)
Dept: URGENT CARE | Facility: CLINIC | Age: 27
End: 2022-05-23
Payer: COMMERCIAL

## 2022-05-23 VITALS
SYSTOLIC BLOOD PRESSURE: 143 MMHG | HEART RATE: 72 BPM | RESPIRATION RATE: 20 BRPM | DIASTOLIC BLOOD PRESSURE: 72 MMHG | OXYGEN SATURATION: 98 %

## 2022-05-23 DIAGNOSIS — L73.9 FOLLICULITIS: ICD-10-CM

## 2022-05-23 DIAGNOSIS — R39.9 UTI SYMPTOMS: ICD-10-CM

## 2022-05-23 DIAGNOSIS — R10.13 EPIGASTRIC ABDOMINAL PAIN: Primary | ICD-10-CM

## 2022-05-23 DIAGNOSIS — R07.89 CHEST TIGHTNESS: ICD-10-CM

## 2022-05-23 PROCEDURE — 99213 OFFICE O/P EST LOW 20 MIN: CPT | Performed by: NURSE PRACTITIONER

## 2022-05-23 NOTE — PROGRESS NOTES
[facility]        NAME: Quentin Dubose is a 24 y o  female  : 1995    MRN: [id number]  DATE: May 23, 2022  TIME: 4:29 PM    Assessment and Plan   Epigastric abdominal pain [R10 13]  1  Epigastric abdominal pain     3  Chest tightness  POCT ECG   4  Folliculitis  fluocinonide (LIDEX) 0 05 % cream     --EKG largely unremarkable  Exam suggestive of possible GI etiology, but cannot rule out cardiopulmonary without further testing  Patient denies acute anxiety/panic attack as the cause of her symptoms  Address as below  Patient Instructions     --Consider trial of inhaler (at home)  If no improvement in symptoms, and/or worsening, advised going directly to ER for further evaluation  --Rx topical steroid sparingly to itchy areas  --OTC Benadryl  --Follow-up with dermatologist for ongoing skin issues  Chief Complaint     Chief Complaint   Patient presents with    Rash     Started yesterday with rash on chest and arms, then started a few hours ago with chest tightness under ribs bilaterally  History of Present Illness       Here with two primary complaints:  --1) Itchy rash on hands, arms, chest, legs since yesterday  Seems to be spreading  No relief from topical Benadryl  No known contact/infectious precipitants  No prior skin issues  No associated throat swelling, tightness  No new meds including PCN  No new foods  --2) Upper epigastric, lower bilateral chest "tightness" since this morning  No radiation to back, upper chest, elsewhere  Associated mild dyspnea  No N/V, heartburn, bloating, cough  Normal appetite  No relief from Tums  Rates 2/10 at present  Some increased with deep breath, turning  No increase with food  No known strain, injury  Left calf pain two days ago ("Terry horse"), but has since resolved  Takes OCP  Denies prior history of DVT, cardiac, pulmonary conditions  Had COVID "four times", however, most recently 2 months ago    Was given inhaler for possible COVID related asthma  Hasn't tried taking this yet  Review of Systems   Review of Systems   Constitutional: Negative for fever  HENT: Negative for sore throat  Respiratory: Positive for shortness of breath  Negative for cough  Cardiovascular: Positive for chest pain  Negative for palpitations  Gastrointestinal: Positive for abdominal pain  Negative for abdominal distention, nausea and vomiting  Musculoskeletal: Negative for myalgias  Skin: Positive for rash  Current Medications       Current Outpatient Medications:     desogestrel-ethinyl estradiol (VELIVET) 0 1/0 125/0 15 -0 025 MG tablet, TAKE 1 TABLET BY MOUTH EVERY DAY, Disp: , Rfl:     fluocinonide (LIDEX) 0 05 % cream, Apply topically 2 (two) times a day, Disp: 60 g, Rfl: 0    chlorhexidine (PERIDEX) 0 12 % solution, 15 mL swish and spit twice daily (Patient not taking: Reported on 10/28/2021), Disp: 118 mL, Rfl: 0    methocarbamol (ROBAXIN) 500 mg tablet, Take 1 tablet (500 mg total) by mouth 4 (four) times a day as needed for muscle spasms (Patient not taking: Reported on 7/18/2021), Disp: 90 tablet, Rfl: 0    naproxen (NAPROSYN) 500 mg tablet, TAKE 1 TABLET BY MOUTH TWICE DAILY (Patient not taking: Reported on 7/18/2021), Disp: 30 tablet, Rfl: 0    Current Allergies     Allergies as of 05/23/2022 - Reviewed 05/23/2022   Allergen Reaction Noted    Penicillins Rash 01/24/2018            The following portions of the patient's history were reviewed and updated as appropriate: allergies, current medications, past family history, past medical history, past social history, past surgical history and problem list      No past medical history on file  No past surgical history on file  No family history on file  Medications have been verified  Objective   /72 (Patient Position: Sitting)   Pulse 72   Resp 20   SpO2 98%   No LMP recorded         Physical Exam     Physical Exam  Constitutional:       General: She is not in acute distress  Appearance: Normal appearance  She is well-developed  She is not ill-appearing, toxic-appearing or diaphoretic  HENT:      Head: Normocephalic  Nose: No congestion  Mouth/Throat:      Pharynx: No oropharyngeal exudate or posterior oropharyngeal erythema  Eyes:      General:         Right eye: No discharge  Left eye: No discharge  Cardiovascular:      Rate and Rhythm: Normal rate and regular rhythm  Heart sounds: Normal heart sounds  No murmur heard  Pulmonary:      Effort: Pulmonary effort is normal  No respiratory distress  Breath sounds: Normal breath sounds  No stridor  No wheezing, rhonchi or rales  Comments: Breathing non-labored  RR=20  No cough noted  Chest:      Chest wall: No tenderness  Abdominal:      General: Abdomen is flat  There is no distension  Palpations: There is no mass  Tenderness: There is abdominal tenderness  There is no guarding  Comments: Upper mid epigastric and RUQ abdominal tenderness  Positive Diop  No palpated mass  Musculoskeletal:         General: No tenderness  Cervical back: Neck supple  Comments: No calf tenderness  Lymphadenopathy:      Cervical: No cervical adenopathy  Skin:     General: Skin is warm and dry  Findings: Rash present  Comments: Forearms, upper chest with few, sparsely scattered, pinpoint (1mm), faintly erythematous, maculopapular lesions in follicular distribution  No rash on face, elsewhere  Neurological:      Mental Status: She is alert and oriented to person, place, and time  Deep Tendon Reflexes: Reflexes are normal and symmetric     Psychiatric:         Mood and Affect: Mood normal

## 2022-05-23 NOTE — PATIENT INSTRUCTIONS
--Consider trial of inhaler (at home)  If no improvement in symptoms, and/or worsening, advised going directly to ER for further evaluation  --Rx topical steroid sparingly to itchy areas  --OTC Benadryl  --Follow-up with dermatologist for ongoing skin issues

## 2022-10-12 PROBLEM — H61.23 BILATERAL IMPACTED CERUMEN: Status: RESOLVED | Noted: 2017-11-10 | Resolved: 2022-10-12

## 2022-10-12 PROBLEM — Z01.419 WELL FEMALE EXAM WITH ROUTINE GYNECOLOGICAL EXAM: Status: RESOLVED | Noted: 2017-11-10 | Resolved: 2022-10-12

## 2022-12-26 ENCOUNTER — APPOINTMENT (OUTPATIENT)
Dept: URGENT CARE | Facility: CLINIC | Age: 27
End: 2022-12-26

## 2022-12-26 ENCOUNTER — TRANSCRIBE ORDERS (OUTPATIENT)
Dept: URGENT CARE | Facility: CLINIC | Age: 27
End: 2022-12-26

## 2022-12-26 DIAGNOSIS — Z02.1 PHYSICAL EXAM, PRE-EMPLOYMENT: Primary | ICD-10-CM

## 2022-12-26 DIAGNOSIS — Z02.1 PHYSICAL EXAM, PRE-EMPLOYMENT: ICD-10-CM

## 2022-12-28 DIAGNOSIS — Z02.1 PRE-EMPLOYMENT HEALTH SCREENING EXAMINATION: Primary | ICD-10-CM

## 2022-12-29 LAB
GAMMA INTERFERON BACKGROUND BLD IA-ACNC: 0.05 IU/ML
M TB IFN-G BLD-IMP: NEGATIVE
M TB IFN-G CD4+ BCKGRND COR BLD-ACNC: 0.01 IU/ML
M TB IFN-G CD4+ BCKGRND COR BLD-ACNC: 0.01 IU/ML
MITOGEN IGNF BCKGRD COR BLD-ACNC: >10 IU/ML